# Patient Record
Sex: MALE | Race: WHITE | NOT HISPANIC OR LATINO | Employment: OTHER | URBAN - METROPOLITAN AREA
[De-identification: names, ages, dates, MRNs, and addresses within clinical notes are randomized per-mention and may not be internally consistent; named-entity substitution may affect disease eponyms.]

---

## 2021-05-04 ENCOUNTER — HISTORICAL (OUTPATIENT)
Dept: ADMINISTRATIVE | Facility: HOSPITAL | Age: 46
End: 2021-05-04

## 2021-05-04 LAB
ABS NEUT (OLG): 3.24 X10(3)/MCL (ref 2.1–9.2)
BASOPHILS # BLD AUTO: 0 X10(3)/MCL (ref 0–0.2)
BASOPHILS NFR BLD AUTO: 1 %
BUN SERPL-MCNC: 5.8 MG/DL (ref 8.9–20.6)
CALCIUM SERPL-MCNC: 9.8 MG/DL (ref 8.4–10.2)
CHLORIDE SERPL-SCNC: 103 MMOL/L (ref 98–107)
CHOLEST SERPL-MCNC: 199 MG/DL
CHOLEST/HDLC SERPL: 6 {RATIO} (ref 0–5)
CO2 SERPL-SCNC: 31 MMOL/L (ref 22–29)
CREAT SERPL-MCNC: 0.8 MG/DL (ref 0.73–1.18)
CREAT/UREA NIT SERPL: 7
EOSINOPHIL # BLD AUTO: 0.1 X10(3)/MCL (ref 0–0.9)
EOSINOPHIL NFR BLD AUTO: 2 %
ERYTHROCYTE [DISTWIDTH] IN BLOOD BY AUTOMATED COUNT: 12.7 % (ref 11.5–17)
EST. AVERAGE GLUCOSE BLD GHB EST-MCNC: 85.3 MG/DL
GLUCOSE SERPL-MCNC: 77 MG/DL (ref 74–100)
HBA1C MFR BLD: 4.6 %
HCT VFR BLD AUTO: 45.3 % (ref 42–52)
HDLC SERPL-MCNC: 34 MG/DL (ref 35–60)
HGB BLD-MCNC: 15 GM/DL (ref 14–18)
LDLC SERPL CALC-MCNC: 115 MG/DL (ref 50–140)
LYMPHOCYTES # BLD AUTO: 1.8 X10(3)/MCL (ref 0.6–4.6)
LYMPHOCYTES NFR BLD AUTO: 30 %
MCH RBC QN AUTO: 27 PG (ref 27–31)
MCHC RBC AUTO-ENTMCNC: 33.1 GM/DL (ref 33–36)
MCV RBC AUTO: 81.6 FL (ref 80–94)
MONOCYTES # BLD AUTO: 0.6 X10(3)/MCL (ref 0.1–1.3)
MONOCYTES NFR BLD AUTO: 11 %
NEUTROPHILS # BLD AUTO: 3.24 X10(3)/MCL (ref 2.1–9.2)
NEUTROPHILS NFR BLD AUTO: 55 %
PLATELET # BLD AUTO: 284 X10(3)/MCL (ref 130–400)
PMV BLD AUTO: 11.3 FL (ref 9.4–12.4)
POTASSIUM SERPL-SCNC: 4.3 MMOL/L (ref 3.5–5.1)
RBC # BLD AUTO: 5.55 X10(6)/MCL (ref 4.7–6.1)
SODIUM SERPL-SCNC: 140 MMOL/L (ref 136–145)
TRIGL SERPL-MCNC: 251 MG/DL (ref 34–140)
VLDLC SERPL CALC-MCNC: 50 MG/DL
WBC # SPEC AUTO: 5.9 X10(3)/MCL (ref 4.5–11.5)

## 2021-06-22 ENCOUNTER — HISTORICAL (OUTPATIENT)
Dept: RADIOLOGY | Facility: HOSPITAL | Age: 46
End: 2021-06-22

## 2021-06-30 ENCOUNTER — HISTORICAL (OUTPATIENT)
Dept: ADMINISTRATIVE | Facility: HOSPITAL | Age: 46
End: 2021-06-30

## 2021-07-27 ENCOUNTER — HISTORICAL (OUTPATIENT)
Dept: ADMINISTRATIVE | Facility: HOSPITAL | Age: 46
End: 2021-07-27

## 2021-08-17 ENCOUNTER — HISTORICAL (OUTPATIENT)
Dept: RESPIRATORY THERAPY | Facility: HOSPITAL | Age: 46
End: 2021-08-17

## 2021-09-15 ENCOUNTER — HISTORICAL (OUTPATIENT)
Dept: RADIOLOGY | Facility: HOSPITAL | Age: 46
End: 2021-09-15

## 2021-10-19 ENCOUNTER — HISTORICAL (OUTPATIENT)
Dept: LAB | Facility: HOSPITAL | Age: 46
End: 2021-10-19

## 2021-10-19 LAB — SARS-COV-2 AG RESP QL IA.RAPID: NEGATIVE

## 2021-10-20 ENCOUNTER — HISTORICAL (OUTPATIENT)
Dept: SURGERY | Facility: HOSPITAL | Age: 46
End: 2021-10-20

## 2021-12-01 ENCOUNTER — HISTORICAL (OUTPATIENT)
Dept: SURGERY | Facility: HOSPITAL | Age: 46
End: 2021-12-01

## 2022-04-10 ENCOUNTER — HISTORICAL (OUTPATIENT)
Dept: ADMINISTRATIVE | Facility: HOSPITAL | Age: 47
End: 2022-04-10
Payer: OTHER GOVERNMENT

## 2022-04-27 VITALS
SYSTOLIC BLOOD PRESSURE: 126 MMHG | OXYGEN SATURATION: 98 % | HEIGHT: 67 IN | BODY MASS INDEX: 35.09 KG/M2 | DIASTOLIC BLOOD PRESSURE: 95 MMHG | WEIGHT: 223.56 LBS

## 2022-05-04 NOTE — HISTORICAL [facility]
This is a historical note converted from Krista. Formatting and pictures may have been removed.  Please reference Krista for original formatting and attached multimedia. Procedure Name  1. Left L4 Transforaminal Epidural Steroid Injection  2. Left L5 Transforaminal Epidural Steroid Injection  ?  Pre-Procedure Diagnoses:  1. Chronic pain syndrome  2. Low back pain  3. Lumbar radiculopathy  4. Lumbar disc displacement  5. Lumbar degenerative disc disease  ?   Post-Procedure Diagnoses:  1. Chronic pain syndrome  2. Low back pain  3. Lumbar radiculopathy  4. Lumbar disc displacement  5. Lumbar degenerative disc disease  ?   Anesthesia:  Local and MAC  ?   Estimated Blood Loss:  None  ?  Complications:  None  ?  Informed Consent:  The procedure, risks, benefits, and alternatives were discussed with the patient.? There were no contraindications to the procedure.? The patient expressed understanding and agreed to proceed.? Fully informed written consent was obtained.?  ?  Description of the Procedure:  The patient was taken to the operating room.? IV access was obtained prior to the start of the procedure.? The patient was positioned prone on the fluoroscopy table.? Continuous hemodynamic monitoring was initiated and continued throughout the duration of the procedure.? The skin overlying the lumbosacral spine was prepped with Chloraprep and draped into a sterile field.? An oblique fluoroscopic view was obtained on the left side at L4,?with the superior articular process of the inferior vertebral body aligned with the pedicle.? Skin anesthesia was achieved using 1 mL of 1% lidocaine.? A 22-gauge 3.5-inch Quinke spinal needle was slowly inserted and advanced towards the 6 oclock position of the pedicle and into the epidural space.? Proper needle position was confirmed under AP, oblique, and lateral fluoroscopic views.? Negative aspiration for blood or CSF was confirmed.? 0.5 mL of Isovue contrast was injected.? Fluoroscopic  imaging revealed a clear outline of the spinal nerve with proximal spread of agent through the neural foramen and into the epidural space.? Then a combination of 5 mg of dexamethasone and 1 mL of 0.25% bupivacaine was easily injected.? There was no pain on injection.? The needle was removed intact and bleeding was nil.? The same procedure was repeated in identical fashion on the left side at L5. Sterile bandages were applied.? The patient was taken to the recovery room for further observation in stable condition.? The patient was then discharged home without any complications.

## 2022-05-04 NOTE — HISTORICAL [facility]
This is a historical note converted from Krista. Formatting and pictures may have been removed.  Please reference Krista for original formatting and attached multimedia. Procedure Name  1. Left L4 Transforaminal Epidural Steroid Injection  2. Left L5 Transforaminal Epidural Steroid Injection  ?  Pre-Procedure Diagnoses:  1. Chronic pain syndrome  2. Low back pain  3. Lumbar radiculopathy  4. Lumbar disc displacement  5. Lumbar degenerative disc disease  ?   Post-Procedure Diagnoses:  1. Chronic pain syndrome  2. Low back pain  3. Lumbar radiculopathy  4. Lumbar disc displacement  5. Lumbar degenerative disc disease  ?   Anesthesia:  Local and MAC  ?   Estimated Blood Loss:  None  ?  Complications:  None  ?  Informed Consent:  The procedure, risks, benefits, and alternatives were discussed with the patient.? There were no contraindications to the procedure.? The patient expressed understanding and agreed to proceed.? Fully informed written consent was obtained.?  ?  Description of the Procedure:  The patient was taken to the operating room.? IV access was obtained prior to the start of the procedure.? The patient was positioned prone on the fluoroscopy table.? Continuous hemodynamic monitoring was initiated and continued throughout the duration of the procedure.? The skin overlying the lumbosacral spine was prepped with Chloraprep and draped into a sterile field.? An oblique fluoroscopic view was obtained on the left side at L4, with the superior articular process of the inferior vertebral body aligned with the pedicle.? Skin anesthesia was achieved using 1 mL of 1% lidocaine.? A 22-gauge 5-inch Quinke spinal needle was slowly inserted and advanced towards the 6 oclock position of the pedicle and into the epidural space.? Proper needle position was confirmed under AP, oblique, and lateral fluoroscopic views.? Negative aspiration for blood or CSF was confirmed.? 0.5 mL of Isovue contrast was injected.? Fluoroscopic  imaging revealed a clear outline of the spinal nerve with proximal spread of agent through the neural foramen and into the epidural space.? Then a combination of 5 mg of dexamethasone and 1 mL of 0.25% bupivacaine was easily injected.? There was no pain on injection.? The needle was removed intact and bleeding was nil.? The same procedure was repeated in identical fashion on the left side at L5. Sterile bandages were applied.? The patient was taken to the recovery room for further observation in stable condition.? The patient was then discharged home without any complications.

## 2022-05-12 DIAGNOSIS — G47.30 SLEEP APNEA, UNSPECIFIED TYPE: Primary | ICD-10-CM

## 2022-05-20 RX ORDER — AMLODIPINE BESYLATE 5 MG/1
5 TABLET ORAL DAILY
COMMUNITY
Start: 2022-01-15 | End: 2023-04-03 | Stop reason: SDUPTHER

## 2022-05-20 RX ORDER — CYCLOBENZAPRINE HCL 10 MG
10 TABLET ORAL 3 TIMES DAILY PRN
COMMUNITY
Start: 2022-03-14 | End: 2022-10-03

## 2022-05-20 RX ORDER — TRAZODONE HYDROCHLORIDE 50 MG/1
25 TABLET ORAL
COMMUNITY
Start: 2021-10-01

## 2022-05-20 RX ORDER — HYDROCHLOROTHIAZIDE 25 MG/1
25 TABLET ORAL DAILY
COMMUNITY
Start: 2022-01-15 | End: 2023-04-03 | Stop reason: SDUPTHER

## 2022-05-23 ENCOUNTER — ANESTHESIA EVENT (OUTPATIENT)
Dept: SURGERY | Facility: HOSPITAL | Age: 47
End: 2022-05-23
Payer: OTHER GOVERNMENT

## 2022-05-24 ENCOUNTER — HOSPITAL ENCOUNTER (OUTPATIENT)
Facility: HOSPITAL | Age: 47
Discharge: HOME | End: 2022-05-24
Attending: SURGERY | Admitting: SURGERY
Payer: OTHER GOVERNMENT

## 2022-05-24 ENCOUNTER — ANESTHESIA (OUTPATIENT)
Dept: SURGERY | Facility: HOSPITAL | Age: 47
End: 2022-05-24
Payer: OTHER GOVERNMENT

## 2022-05-24 VITALS
HEART RATE: 77 BPM | TEMPERATURE: 98 F | OXYGEN SATURATION: 96 % | RESPIRATION RATE: 18 BRPM | HEIGHT: 67 IN | DIASTOLIC BLOOD PRESSURE: 96 MMHG | BODY MASS INDEX: 35.98 KG/M2 | WEIGHT: 229.25 LBS | SYSTOLIC BLOOD PRESSURE: 147 MMHG

## 2022-05-24 PROCEDURE — 01810 ANES PX NRV MUSC F/ARM WRST: CPT | Performed by: SURGERY

## 2022-05-24 PROCEDURE — 25000003 PHARM REV CODE 250: Performed by: NURSE ANESTHETIST, CERTIFIED REGISTERED

## 2022-05-24 PROCEDURE — 36000706: Performed by: SURGERY

## 2022-05-24 PROCEDURE — 71000015 HC POSTOP RECOV 1ST HR: Performed by: SURGERY

## 2022-05-24 PROCEDURE — 63600175 PHARM REV CODE 636 W HCPCS: Performed by: ANESTHESIOLOGY

## 2022-05-24 PROCEDURE — 36000707: Performed by: SURGERY

## 2022-05-24 PROCEDURE — 63600175 PHARM REV CODE 636 W HCPCS

## 2022-05-24 PROCEDURE — 71000016 HC POSTOP RECOV ADDL HR: Performed by: SURGERY

## 2022-05-24 PROCEDURE — 25000003 PHARM REV CODE 250: Performed by: SURGERY

## 2022-05-24 PROCEDURE — 37000008 HC ANESTHESIA 1ST 15 MINUTES: Performed by: SURGERY

## 2022-05-24 PROCEDURE — 37000009 HC ANESTHESIA EA ADD 15 MINS: Performed by: SURGERY

## 2022-05-24 PROCEDURE — 63600175 PHARM REV CODE 636 W HCPCS: Performed by: NURSE ANESTHETIST, CERTIFIED REGISTERED

## 2022-05-24 PROCEDURE — 64415 NJX AA&/STRD BRCH PLXS IMG: CPT | Mod: 59,RT | Performed by: ANESTHESIOLOGY

## 2022-05-24 RX ORDER — HYDROMORPHONE HYDROCHLORIDE 2 MG/ML
0.2 INJECTION, SOLUTION INTRAMUSCULAR; INTRAVENOUS; SUBCUTANEOUS EVERY 5 MIN PRN
Status: CANCELLED | OUTPATIENT
Start: 2022-05-24

## 2022-05-24 RX ORDER — MIDAZOLAM HYDROCHLORIDE 1 MG/ML
INJECTION INTRAMUSCULAR; INTRAVENOUS
Status: COMPLETED
Start: 2022-05-24 | End: 2022-05-24

## 2022-05-24 RX ORDER — KETOROLAC TROMETHAMINE 30 MG/ML
15 INJECTION, SOLUTION INTRAMUSCULAR; INTRAVENOUS EVERY 8 HOURS PRN
Status: CANCELLED | OUTPATIENT
Start: 2022-05-24 | End: 2022-05-26

## 2022-05-24 RX ORDER — ONDANSETRON 4 MG/1
8 TABLET, ORALLY DISINTEGRATING ORAL EVERY 6 HOURS PRN
Status: DISCONTINUED | OUTPATIENT
Start: 2022-05-24 | End: 2022-05-24 | Stop reason: HOSPADM

## 2022-05-24 RX ORDER — ROPIVACAINE HYDROCHLORIDE 5 MG/ML
INJECTION, SOLUTION EPIDURAL; INFILTRATION; PERINEURAL
Status: COMPLETED
Start: 2022-05-24 | End: 2022-05-24

## 2022-05-24 RX ORDER — SODIUM CHLORIDE 0.9 % (FLUSH) 0.9 %
3 SYRINGE (ML) INJECTION
Status: DISCONTINUED | OUTPATIENT
Start: 2022-05-24 | End: 2022-05-24 | Stop reason: HOSPADM

## 2022-05-24 RX ORDER — SODIUM CHLORIDE, SODIUM LACTATE, POTASSIUM CHLORIDE, CALCIUM CHLORIDE 600; 310; 30; 20 MG/100ML; MG/100ML; MG/100ML; MG/100ML
INJECTION, SOLUTION INTRAVENOUS CONTINUOUS
Status: DISCONTINUED | OUTPATIENT
Start: 2022-05-24 | End: 2022-05-24 | Stop reason: HOSPADM

## 2022-05-24 RX ORDER — ROPIVACAINE HYDROCHLORIDE 5 MG/ML
INJECTION, SOLUTION EPIDURAL; INFILTRATION; PERINEURAL
Status: COMPLETED | OUTPATIENT
Start: 2022-05-24 | End: 2022-05-24

## 2022-05-24 RX ORDER — PROPOFOL 10 MG/ML
VIAL (ML) INTRAVENOUS
Status: DISCONTINUED | OUTPATIENT
Start: 2022-05-24 | End: 2022-05-24

## 2022-05-24 RX ORDER — METOCLOPRAMIDE HYDROCHLORIDE 5 MG/ML
10 INJECTION INTRAMUSCULAR; INTRAVENOUS EVERY 10 MIN PRN
Status: CANCELLED | OUTPATIENT
Start: 2022-05-24

## 2022-05-24 RX ORDER — FENTANYL CITRATE 50 UG/ML
INJECTION, SOLUTION INTRAMUSCULAR; INTRAVENOUS
Status: COMPLETED
Start: 2022-05-24 | End: 2022-05-24

## 2022-05-24 RX ORDER — LIDOCAINE HYDROCHLORIDE 10 MG/ML
INJECTION INFILTRATION; PERINEURAL
Status: DISCONTINUED | OUTPATIENT
Start: 2022-05-24 | End: 2022-05-24 | Stop reason: HOSPADM

## 2022-05-24 RX ORDER — ONDANSETRON 2 MG/ML
4 INJECTION INTRAMUSCULAR; INTRAVENOUS DAILY PRN
Status: CANCELLED | OUTPATIENT
Start: 2022-05-24

## 2022-05-24 RX ORDER — SODIUM CHLORIDE, SODIUM GLUCONATE, SODIUM ACETATE, POTASSIUM CHLORIDE AND MAGNESIUM CHLORIDE 30; 37; 368; 526; 502 MG/100ML; MG/100ML; MG/100ML; MG/100ML; MG/100ML
1000 INJECTION, SOLUTION INTRAVENOUS CONTINUOUS
Status: DISCONTINUED | OUTPATIENT
Start: 2022-05-24 | End: 2022-05-24 | Stop reason: HOSPADM

## 2022-05-24 RX ORDER — LIDOCAINE HYDROCHLORIDE 10 MG/ML
1 INJECTION, SOLUTION EPIDURAL; INFILTRATION; INTRACAUDAL; PERINEURAL ONCE
Status: DISCONTINUED | OUTPATIENT
Start: 2022-05-24 | End: 2022-05-24 | Stop reason: HOSPADM

## 2022-05-24 RX ORDER — LIDOCAINE HYDROCHLORIDE 10 MG/ML
INJECTION, SOLUTION EPIDURAL; INFILTRATION; INTRACAUDAL; PERINEURAL
Status: DISCONTINUED | OUTPATIENT
Start: 2022-05-24 | End: 2022-05-24

## 2022-05-24 RX ADMIN — LIDOCAINE HYDROCHLORIDE 40 MG: 10 INJECTION, SOLUTION EPIDURAL; INFILTRATION; INTRACAUDAL; PERINEURAL at 09:05

## 2022-05-24 RX ADMIN — MIDAZOLAM HYDROCHLORIDE 2 MG: 1 INJECTION, SOLUTION INTRAMUSCULAR; INTRAVENOUS at 09:05

## 2022-05-24 RX ADMIN — PROPOFOL 100 MG: 10 INJECTION, EMULSION INTRAVENOUS at 09:05

## 2022-05-24 RX ADMIN — SODIUM CHLORIDE, POTASSIUM CHLORIDE, SODIUM LACTATE AND CALCIUM CHLORIDE: 600; 310; 30; 20 INJECTION, SOLUTION INTRAVENOUS at 09:05

## 2022-05-24 RX ADMIN — MIDAZOLAM HYDROCHLORIDE 1 MG: 1 INJECTION, SOLUTION INTRAMUSCULAR; INTRAVENOUS at 09:05

## 2022-05-24 RX ADMIN — FENTANYL CITRATE 25 MCG: 50 INJECTION, SOLUTION INTRAMUSCULAR; INTRAVENOUS at 09:05

## 2022-05-24 RX ADMIN — PROPOFOL 50 MG: 10 INJECTION, EMULSION INTRAVENOUS at 10:05

## 2022-05-24 RX ADMIN — PROPOFOL 110 MG: 10 INJECTION, EMULSION INTRAVENOUS at 09:05

## 2022-05-24 RX ADMIN — ROPIVACAINE HYDROCHLORIDE 30 ML: 5 INJECTION, SOLUTION EPIDURAL; INFILTRATION; PERINEURAL at 09:05

## 2022-05-24 RX ADMIN — PROPOFOL 75 MG: 10 INJECTION, EMULSION INTRAVENOUS at 10:05

## 2022-05-24 NOTE — ANESTHESIA PREPROCEDURE EVALUATION
05/23/2022  Saad Lopez is a 47 y.o. Obese , male.presents with Right hand carpal tunnel syndrome.   (Carpal tunnel syndrome, right [G56.01])     He comes to [facility] for the above procedure under Regional/Supraclavicular block with IV sedation/Propofol infusion.    PMHx:  [place]    Pre-op Assessment    I have reviewed the Patient Summary Reports.     I have reviewed the Nursing Notes. I have reviewed the NPO Status.   I have reviewed the Medications.     Review of Systems  Anesthesia Hx:  No problems with previous Anesthesia    Social:  Non-Smoker    Hematology/Oncology:  Hematology Normal   Oncology Normal     EENT/Dental:EENT/Dental Normal   Cardiovascular:   Exercise tolerance: good Hypertension  Functional Capacity good / => 4 METS    Pulmonary:   Sleep Apnea    Renal/:  Renal/ Normal     Hepatic/GI:  Hepatic/GI Normal    Musculoskeletal:  Musculoskeletal Normal    Neurological:   Neuromuscular Disease, Carpal Tunnel syndrome, Right hand/wrist.   Endocrine:  Endocrine Normal  Obesity / BMI > 30  Dermatological:  Skin Normal    Psych:  Psychiatric Normal           Physical Exam  General: Alert, Oriented, Well nourished and Cooperative    Airway:  Mallampati: II   Mouth Opening: Normal  TM Distance: Normal  Tongue: Normal  Neck ROM: Normal ROM    Dental:  Intact    Chest/Lungs:  Clear to auscultation, Normal Respiratory Rate    Heart:  Rate: Normal  Rhythm: Regular Rhythm        Anesthesia Plan  Type of Anesthesia, risks & benefits discussed:    Anesthesia Type: Regional, Gen Natural Airway  Intra-op Monitoring Plan: Standard ASA Monitors  Post Op Pain Control Plan: peripheral nerve block  Induction:  IV  Informed Consent: Informed consent signed with the Patient and all parties understand the risks and agree with anesthesia plan.  All questions answered.   ASA Score: 2  Day of Surgery Review  of History & Physical: H&P Update referred to the surgeon/provider.    Ready For Surgery From Anesthesia Perspective.     .

## 2022-05-24 NOTE — OP NOTE
[facility]                       [address]                      [place] [address]    PATIENT NAME:      CARA ANGLIN  YOB: 1975  CSN:               [id number]  MRN:               [id number]  ADMIT DATE:        05/24/2022 07:08:00  PHYSICIAN:         Mason Palmer MD                          OPERATIVE REPORT      DATE OF SURGERY:        SURGEON:  Mason Palmer MD    PREOPERATIVE DIAGNOSIS:  Carpal tunnel right wrist.    POSTOPERATIVE DIAGNOSIS:  Carpal tunnel right wrist.    PROCEDURE:  Carpal tunnel release.    ANESTHESIA:  Axillary block with supplemental local infiltration.    ESTIMATED BLOOD LOSS:  Minimal.    DESCRIPTION OF PROCEDURE:  In the operating suite under axillary block   anesthetic, the right arm was prepped and draped in a sterile fashion. The arm   was exsanguinated and tourniquet inflated to 250 mmHg.  Supplemental local   infiltration of Xylocaine was performed in the distal wrist and palm.  Right   palm was explored through a small proximal curved incision. The skin and   subcutaneous tissues were incised with the Bovie cautery.  The median nerve was   identified at the level of the distal wrist crease and the carpal ligament   transected on the ulnar border of the canal throughout the entire length of the   canal using a 69 Lytton blade.  During the dissection, care was taken to   identify and preserve the recurrent motor branch. I turned proximally in the   distal forearm and released the subcutaneous fascia.  The epineurium surrounding   the nerve was gently teased with fine pickups and forceps.  Once the nerve was   completely freed, the tourniquet was released. Final hemostasis obtained with   the Bovie cautery.  Skin incision was closed with vertical mattress sutures of   5-0 and 6-0 Prolene.  Sterile splint dressing was then applied and procedure    terminated.    ______________________________  MD ROBERT Houston/HERRERA  DD:  05/24/2022  Time:  10:40AM  DT:  05/24/2022  Time:  01:05PM  Job #:  [id number]      OPERATIVE REPORT

## 2022-05-24 NOTE — ADDENDUM NOTE
Addendum  created 05/24/22 1117 by Cedrick Fuentes MD    Clinical Note Signed, Diagnosis association updated, Intraprocedure Blocks edited, SmartForm saved

## 2022-05-24 NOTE — DISCHARGE INSTRUCTIONS
FOLLOW DR LIGHT'S INSTRUCTIONS POST OPERATIVELY...    WEAR THE SLING FOR THE FIRST 24 HOURS..    DO NOT REMOVE THE DRESSING, KEEP DRY AND INTACT....YOU MAY COVER WITH A PLASTIC BAG TO SHOWER...  DRESSING TO BE REMOVED AT YOUR FOLLOW UP APPOINTMENT     YOU WANT TO REMEMBER TO MOVE YOUR FINGERS.    IF YOU HAVE ANY QUESTIONS OR CONCERNS DO NOT HESITATE TO CALL...    TAKE YOUR MEDS AS DIRECTED....    KEEP YOUR FOLLOW UP APPOINTMENT

## 2022-05-24 NOTE — ANESTHESIA POSTPROCEDURE EVALUATION
Anesthesia Post Evaluation    Patient: Saad Lopez    Procedure(s) Performed: Procedure(s) (LRB):  RELEASE, CARPAL TUNNEL Right / Axillary Block (Right)    Final Anesthesia Type: MAC      Patient location during evaluation: OPS  Patient participation: Yes- Able to Participate  Level of consciousness: awake and alert  Post-procedure vital signs: reviewed and stable  Pain management: adequate  Airway patency: patent    PONV status at discharge: No PONV  Anesthetic complications: no      Cardiovascular status: blood pressure returned to baseline  Respiratory status: unassisted and room air  Hydration status: euvolemic  Follow-up not needed.          Vitals Value Taken Time   /114 05/24/22 0737   Temp 36.8 °C (98.2 °F) 05/24/22 0737   Pulse 83 05/24/22 0737   Resp 18 05/24/22 0925   SpO2 98 % 05/24/22 0737         No case tracking events are documented in the log.      Pain/Timmy Score: Pain Rating Prior to Med Admin: 4 (5/24/2022  9:25 AM)

## 2022-05-24 NOTE — ANESTHESIA PROCEDURE NOTES
Peripheral Block    Patient location during procedure: pre-op   Block not for primary anesthetic.  Reason for block: at surgeon's request and post-op pain management   Post-op Pain Location: Carpal Tunnel Syndrome, right   Start time: 5/24/2022 9:26 AM  Timeout: 5/24/2022 9:15 AM   End time: 5/24/2022 9:35 AM    Staffing  Authorizing Provider: Cedrick Fuentes MD  Performing Provider: Cedrick Fuentes MD    Preanesthetic Checklist  Completed: patient identified, IV checked, site marked, risks and benefits discussed, surgical consent, monitors and equipment checked, pre-op evaluation and timeout performed  Peripheral Block  Patient position: supine  Prep: ChloraPrep  Patient monitoring: heart rate, cardiac monitor, continuous pulse ox, continuous capnometry and frequent blood pressure checks  Block type: supraclavicular  Laterality: right  Injection technique: single shot  Needle  Needle type: Stimuplex   Needle gauge: 22 G  Needle length: 3.5 in  Needle localization: anatomical landmarks, ultrasound guidance, nerve stimulator and paresthesias   -ultrasound image captured on disc.  Assessment  Injection assessment: negative aspiration, negative parasthesia and local visualized surrounding nerve  Paresthesia pain: immediately resolved  Heart rate change: no  Slow fractionated injection: yes  Pain Tolerance: comfortable throughout block and no complaints  Medications:    Medications: ropivacaine (NAROPIN) injection 0.5% - Perineural, Right Supraclavicular   30 mL - 5/24/2022 9:25:00 AM    Additional Notes  Block was technically difficult secondary to size and anatomy(Obesity). Location of nerve bundle identified and local deposited around bundle.  Paresthesia was noted but immediately resolved in his Right hand.          VSS.  [facility] RN monitoring vitals throughout procedure.  Patient tolerated procedure well with sedation.

## 2022-05-30 DIAGNOSIS — N40.1 BENIGN PROSTATIC HYPERPLASIA WITH LOWER URINARY TRACT SYMPTOMS, SYMPTOM DETAILS UNSPECIFIED: Primary | ICD-10-CM

## 2022-05-30 RX ORDER — TAMSULOSIN HYDROCHLORIDE 0.4 MG/1
0.4 CAPSULE ORAL DAILY
Qty: 30 CAPSULE | Refills: 11 | Status: SHIPPED | OUTPATIENT
Start: 2022-05-30 | End: 2023-07-26

## 2022-07-27 LAB — CRC RECOMMENDATION EXT: NORMAL

## 2022-07-29 ENCOUNTER — OFFICE VISIT (OUTPATIENT)
Dept: UROLOGY | Facility: CLINIC | Age: 47
End: 2022-07-29
Payer: OTHER GOVERNMENT

## 2022-07-29 VITALS
HEIGHT: 67 IN | RESPIRATION RATE: 20 BRPM | DIASTOLIC BLOOD PRESSURE: 85 MMHG | BODY MASS INDEX: 35.36 KG/M2 | OXYGEN SATURATION: 96 % | TEMPERATURE: 98 F | WEIGHT: 225.31 LBS | SYSTOLIC BLOOD PRESSURE: 124 MMHG | HEART RATE: 73 BPM

## 2022-07-29 DIAGNOSIS — N40.0 BENIGN PROSTATIC HYPERPLASIA WITHOUT LOWER URINARY TRACT SYMPTOMS: ICD-10-CM

## 2022-07-29 PROCEDURE — 99214 OFFICE O/P EST MOD 30 MIN: CPT | Mod: PBBFAC | Performed by: NURSE PRACTITIONER

## 2022-07-29 PROCEDURE — 99203 OFFICE O/P NEW LOW 30 MIN: CPT | Mod: S$PBB,,, | Performed by: NURSE PRACTITIONER

## 2022-07-29 PROCEDURE — 99203 PR OFFICE/OUTPT VISIT, NEW, LEVL III, 30-44 MIN: ICD-10-PCS | Mod: S$PBB,,, | Performed by: NURSE PRACTITIONER

## 2022-07-29 NOTE — PROGRESS NOTES
Chief Complaint:   Chief Complaint   Patient presents with    Benign Prostatic Hypertrophy       HPI:  Patient is a 47-year-old male 3 month follow-up for BPH.  Patient was started on Flomax and instructed on behavior modification for C/O decreased urine stream, + hesitancy, +straining,+ interruption of stream, + dribbling. Today patient urine stream strong, he denies hesitancy, straining, interruption of stream, dribbling, frequency, nocturia as long as he takes his Flomax.       Allergies:  Review of patient's allergies indicates:   Allergen Reactions    Pcn [penicillins] Swelling and Rash       Medications:  Current Outpatient Medications   Medication Sig Dispense Refill    amLODIPine (NORVASC) 5 MG tablet Take 5 mg by mouth once daily.      cyclobenzaprine (FLEXERIL) 10 MG tablet Take 10 mg by mouth 3 (three) times daily as needed.      hydroCHLOROthiazide (HYDRODIURIL) 25 MG tablet Take 25 mg by mouth once daily.      tamsulosin (FLOMAX) 0.4 mg Cap Take 1 capsule (0.4 mg total) by mouth once daily. 30 capsule 11    traZODone (DESYREL) 50 MG tablet Take 25 mg by mouth.       No current facility-administered medications for this visit.       Review of Systems:  General: No fever, chills, fatigability, or weight loss.  Skin: No rashes, itching, or changes in color or texture of skin.  Chest: Denies COX, cyanosis, wheezing, cough, and sputum production.  Abdomen: Appetite fine. No weight loss. Denies diarrhea, abdominal pain, hematemesis, or blood in stool.  Musculoskeletal: No joint stiffness or swelling. Denies back pain.  : As above.  All other review of systems negative.    PMH:  Past Medical History:   Diagnosis Date    Carpal tunnel syndrome on right     Chronic pain     Hypertension     Mixed hyperlipidemia     Sleep apnea     Tinnitus        PSH:  Past Surgical History:   Procedure Laterality Date    CARPAL TUNNEL RELEASE Right 5/24/2022    Procedure: RELEASE, CARPAL TUNNEL Right / Axillary  Block;  Surgeon: Mason Palmer MD;  Location: [facility];  Service: General;  Laterality: Right;    EPIDURAL STEROID INJECTION  10/2021    2021    TYMPANOSTOMY TUBE PLACEMENT      x9       FamHx:  Family History   Problem Relation Age of Onset    Hypertension Mother     Skin cancer Father     Prostate cancer Father        SocHx:  Social History     Socioeconomic History    Marital status:    Tobacco Use    Smoking status: Former Smoker     Packs/day: 0.50     Years: 10.00     Pack years: 5.00     Quit date:      Years since quittin.5    Smokeless tobacco: Never Used   Substance and Sexual Activity    Alcohol use: Not Currently    Drug use: Never    Sexual activity: Not Currently       Physical Exam:  Vitals:    22 1101   BP: 124/85   Pulse: 73   Resp: 20   Temp: 98.4 °F (36.9 °C)     General: A&Ox3, no apparent distress, no deformities  Neck: No masses, normal thyroid  Lungs: CTA merced, no use of accessory muscles  Heart: RRR, no arrhythmias  Abdomen: Soft, NT, ND, no masses, no hernias, no hepatosplenomegaly  Lymphatic: Neck and groin nodes negative  Skin: The skin is warm and dry. No jaundice.  Ext: No c/c/e.      Imaging:  None      Impression:  BPH    Plan: Continue Flomax F/U PRN

## 2022-09-20 DIAGNOSIS — R91.8 ABNORMAL CT SCAN, LUNG: Primary | ICD-10-CM

## 2022-09-30 ENCOUNTER — HOSPITAL ENCOUNTER (OUTPATIENT)
Dept: RADIOLOGY | Facility: HOSPITAL | Age: 47
Discharge: HOME | End: 2022-09-30
Attending: STUDENT IN AN ORGANIZED HEALTH CARE EDUCATION/TRAINING PROGRAM
Payer: OTHER GOVERNMENT

## 2022-09-30 DIAGNOSIS — R91.8 ABNORMAL CT SCAN, LUNG: ICD-10-CM

## 2022-09-30 PROCEDURE — 71250 CT THORAX DX C-: CPT | Mod: TC

## 2022-10-03 ENCOUNTER — OFFICE VISIT (OUTPATIENT)
Dept: INTERNAL MEDICINE | Facility: CLINIC | Age: 47
End: 2022-10-03
Payer: OTHER GOVERNMENT

## 2022-10-03 VITALS
SYSTOLIC BLOOD PRESSURE: 136 MMHG | BODY MASS INDEX: 35.79 KG/M2 | DIASTOLIC BLOOD PRESSURE: 74 MMHG | HEIGHT: 67 IN | OXYGEN SATURATION: 96 % | HEART RATE: 77 BPM | RESPIRATION RATE: 18 BRPM | WEIGHT: 228 LBS

## 2022-10-03 DIAGNOSIS — Z00.00 WELLNESS EXAMINATION: Primary | ICD-10-CM

## 2022-10-03 DIAGNOSIS — N40.0 BENIGN PROSTATIC HYPERPLASIA WITHOUT LOWER URINARY TRACT SYMPTOMS: ICD-10-CM

## 2022-10-03 DIAGNOSIS — I10 PRIMARY HYPERTENSION: ICD-10-CM

## 2022-10-03 PROCEDURE — 99396 PR PREVENTIVE VISIT,EST,40-64: ICD-10-PCS | Mod: 25,,, | Performed by: STUDENT IN AN ORGANIZED HEALTH CARE EDUCATION/TRAINING PROGRAM

## 2022-10-03 PROCEDURE — 99396 PREV VISIT EST AGE 40-64: CPT | Mod: 25,,, | Performed by: STUDENT IN AN ORGANIZED HEALTH CARE EDUCATION/TRAINING PROGRAM

## 2022-10-03 PROCEDURE — 90686 FLU VACCINE (QUAD) GREATER THAN OR EQUAL TO 3YO PRESERVATIVE FREE IM: ICD-10-PCS | Mod: ,,, | Performed by: STUDENT IN AN ORGANIZED HEALTH CARE EDUCATION/TRAINING PROGRAM

## 2022-10-03 PROCEDURE — 90471 FLU VACCINE (QUAD) GREATER THAN OR EQUAL TO 3YO PRESERVATIVE FREE IM: ICD-10-PCS | Mod: ,,, | Performed by: STUDENT IN AN ORGANIZED HEALTH CARE EDUCATION/TRAINING PROGRAM

## 2022-10-03 PROCEDURE — 90686 IIV4 VACC NO PRSV 0.5 ML IM: CPT | Mod: ,,, | Performed by: STUDENT IN AN ORGANIZED HEALTH CARE EDUCATION/TRAINING PROGRAM

## 2022-10-03 PROCEDURE — 90471 IMMUNIZATION ADMIN: CPT | Mod: ,,, | Performed by: STUDENT IN AN ORGANIZED HEALTH CARE EDUCATION/TRAINING PROGRAM

## 2022-10-03 RX ORDER — TRIAMCINOLONE ACETONIDE 5 MG/G
OINTMENT TOPICAL
COMMUNITY
Start: 2022-06-10

## 2022-10-03 RX ORDER — IPRATROPIUM BROMIDE 42 UG/1
SPRAY, METERED NASAL
COMMUNITY
Start: 2022-08-23

## 2022-10-03 RX ORDER — LORATADINE 10 MG/1
10 TABLET ORAL DAILY
COMMUNITY
Start: 2022-06-10

## 2022-10-03 RX ORDER — FLUTICASONE PROPIONATE 50 MCG
1 SPRAY, SUSPENSION (ML) NASAL 2 TIMES DAILY
COMMUNITY
Start: 2022-08-23

## 2022-10-03 RX ORDER — CHOLECALCIFEROL (VITAMIN D3) 50 MCG
TABLET ORAL
COMMUNITY
Start: 2022-06-10

## 2022-10-03 NOTE — PROGRESS NOTES
Subjective:      Saad Lopez  10/03/2022  [id number]      Chief Complaint: Follow-up and ear are stuffed up       HPI:  Mr Saad presents for follow up and wellness. Patient is doing well, states had a URI last week, has recovered but continues to feel pressure in his ears. No other complaints, recently did follow up CT chest.      Past Medical History:   Diagnosis Date    Carpal tunnel syndrome on right     Chronic pain     Hypertension     Mixed hyperlipidemia     Sleep apnea     Tinnitus      Past Surgical History:   Procedure Laterality Date    CARPAL TUNNEL RELEASE Right 2022    Procedure: RELEASE, CARPAL TUNNEL Right / Axillary Block;  Surgeon: Mason Palmer MD;  Location: [facility];  Service: General;  Laterality: Right;    EPIDURAL STEROID INJECTION  10/2021    2021    TYMPANOSTOMY TUBE PLACEMENT      x9     Family History   Problem Relation Age of Onset    Hypertension Mother     Skin cancer Father     Prostate cancer Father      Social History     Tobacco Use    Smoking status: Former     Packs/day: 0.50     Years: 10.00     Pack years: 5.00     Types: Cigarettes     Quit date:      Years since quittin.7    Smokeless tobacco: Never   Substance and Sexual Activity    Alcohol use: Not Currently    Drug use: Never    Sexual activity: Not Currently     Review of patient's allergies indicates:   Allergen Reactions    Pcn [penicillins] Swelling and Rash       The following were reviewed at this visit: active problem list, medication list, allergies, family history, social history, and health maintenance.    Medications:    Current Outpatient Medications:     amLODIPine (NORVASC) 5 MG tablet, Take 5 mg by mouth once daily., Disp: , Rfl:     cholecalciferol, vitamin D3, (VITAMIN D3) 50 mcg (2,000 unit) Tab, Take by mouth., Disp: , Rfl:     fluticasone propionate (FLONASE) 50 mcg/actuation nasal spray, 1 spray by Each Nostril route 2 (two) times daily., Disp: , Rfl:      "hydroCHLOROthiazide (HYDRODIURIL) 25 MG tablet, Take 25 mg by mouth once daily., Disp: , Rfl:     ipratropium (ATROVENT) 42 mcg (0.06 %) nasal spray, SMARTSI Spray(s) Both Nares 3 Times Daily PRN, Disp: , Rfl:     loratadine (CLARITIN) 10 mg tablet, Take 10 mg by mouth once daily., Disp: , Rfl:     tamsulosin (FLOMAX) 0.4 mg Cap, Take 1 capsule (0.4 mg total) by mouth once daily., Disp: 30 capsule, Rfl: 11    traZODone (DESYREL) 50 MG tablet, Take 25 mg by mouth., Disp: , Rfl:     triamcinolone (KENALOG) 0.5 % ointment, Apply topically., Disp: , Rfl:       Medications have been reviewed and reconciled with patient at this visit.  Barriers to medications reviewed with patient.    Adverse reactions to current medications reviewed with patient..    Over the counter medications reviewed and reconciled with patient.  Review of Systems   Constitutional:  Negative for chills, diaphoresis, fever and weight loss.   HENT:  Negative for congestion, ear discharge, sinus pain and sore throat.    Eyes:  Negative for photophobia.   Respiratory:  Negative for cough, hemoptysis and shortness of breath.    Cardiovascular:  Negative for chest pain, palpitations, claudication, leg swelling and PND.   Gastrointestinal:  Negative for constipation, diarrhea, nausea and vomiting.   Genitourinary:  Negative for dysuria, frequency and urgency.   Musculoskeletal:  Negative for back pain, joint pain, myalgias and neck pain.   Skin:  Negative for itching and rash.   Neurological:  Negative for dizziness, focal weakness and headaches.   Psychiatric/Behavioral:  Negative for depression. The patient does not have insomnia.          Objective:      Vitals:    10/03/22 1017   BP: 136/74   BP Location: Left arm   Pulse: 77   Resp: 18   SpO2: 96%   Weight: 103.4 kg (228 lb)   Height: 5' 7" (1.702 m)       Physical Exam  Constitutional:       Appearance: Normal appearance.   HENT:      Head: Normocephalic and atraumatic.      Right Ear: Tympanic " membrane, ear canal and external ear normal.      Left Ear: Tympanic membrane, ear canal and external ear normal.   Cardiovascular:      Rate and Rhythm: Normal rate and regular rhythm.      Pulses: Normal pulses.   Pulmonary:      Effort: Pulmonary effort is normal.      Breath sounds: Normal breath sounds.   Abdominal:      General: Abdomen is flat. Bowel sounds are normal. There is no distension.      Palpations: Abdomen is soft.      Tenderness: There is no abdominal tenderness.   Musculoskeletal:         General: No tenderness. Normal range of motion.      Right lower leg: No edema.      Left lower leg: No edema.   Lymphadenopathy:      Cervical: No cervical adenopathy.   Neurological:      General: No focal deficit present.      Mental Status: He is alert and oriented to person, place, and time.             Assessment and Plan:       1. Wellness examination  Assessment & Plan:  Colonoscopy: up to date  Influenza vaccine: received today  Labs: ordered today     Orders:  -     CBC Auto Differential  -     Basic Metabolic Panel  -     Lipid Panel    2. Benign prostatic hyperplasia without lower urinary tract symptoms  Assessment & Plan:  Follows with Urology  Continue current medications       3. Primary hypertension  Assessment & Plan:  Controlled  Continue current medications       Other orders  -     Influenza - Quadrivalent *Preferred* (6 months+) (PF)          Follow up: Follow up in about 6 months (around 4/3/2023) for Bp follow up.

## 2023-01-02 PROBLEM — Z00.00 WELLNESS EXAMINATION: Status: RESOLVED | Noted: 2022-10-03 | Resolved: 2023-01-02

## 2023-03-27 ENCOUNTER — TELEPHONE (OUTPATIENT)
Dept: INTERNAL MEDICINE | Facility: CLINIC | Age: 48
End: 2023-03-27
Payer: OTHER GOVERNMENT

## 2023-04-03 ENCOUNTER — OFFICE VISIT (OUTPATIENT)
Dept: INTERNAL MEDICINE | Facility: CLINIC | Age: 48
End: 2023-04-03
Payer: OTHER GOVERNMENT

## 2023-04-03 VITALS
BODY MASS INDEX: 34.69 KG/M2 | HEIGHT: 67 IN | WEIGHT: 221 LBS | DIASTOLIC BLOOD PRESSURE: 91 MMHG | SYSTOLIC BLOOD PRESSURE: 138 MMHG

## 2023-04-03 DIAGNOSIS — I10 PRIMARY HYPERTENSION: ICD-10-CM

## 2023-04-03 DIAGNOSIS — R21 RASH: ICD-10-CM

## 2023-04-03 PROCEDURE — 99214 OFFICE O/P EST MOD 30 MIN: CPT | Mod: ,,, | Performed by: STUDENT IN AN ORGANIZED HEALTH CARE EDUCATION/TRAINING PROGRAM

## 2023-04-03 PROCEDURE — 99214 PR OFFICE/OUTPT VISIT, EST, LEVL IV, 30-39 MIN: ICD-10-PCS | Mod: ,,, | Performed by: STUDENT IN AN ORGANIZED HEALTH CARE EDUCATION/TRAINING PROGRAM

## 2023-04-03 RX ORDER — AMLODIPINE BESYLATE 10 MG/1
10 TABLET ORAL DAILY
Qty: 30 TABLET | Refills: 3 | Status: SHIPPED | OUTPATIENT
Start: 2023-04-03 | End: 2023-09-15

## 2023-04-03 RX ORDER — METHYLPREDNISOLONE 4 MG/1
TABLET ORAL
Qty: 21 EACH | Refills: 0 | Status: SHIPPED | OUTPATIENT
Start: 2023-04-03 | End: 2023-04-25

## 2023-04-03 RX ORDER — HYDROCHLOROTHIAZIDE 25 MG/1
25 TABLET ORAL DAILY
Qty: 30 TABLET | Refills: 3 | Status: SHIPPED | OUTPATIENT
Start: 2023-04-03 | End: 2023-09-15

## 2023-04-05 PROBLEM — R21 RASH: Status: ACTIVE | Noted: 2023-04-05

## 2023-04-05 NOTE — ASSESSMENT & PLAN NOTE
Uncontrolled  Will increase amlodipine to 10 mg QD  Will continue HCTZ 25 mg QD  Continue to check blood pressure at home  Will follow up in 3 weeks

## 2023-04-05 NOTE — PROGRESS NOTES
Subjective:      Saad Lopez  2023  [id number]      Chief Complaint: Follow-up (Patient has poison ivy. )       HPI:  Mr Nash presents for blood pressure follow up. Blood pressure is slightly elevated, patient reports compliant with medications. Presents with rash on both upper extremities with pruritus, states was working outside and after that rash started went to urgent care received a steroid injection and was prescribed triamcinolone, rash has improved but still present.     Past Medical History:   Diagnosis Date    Carpal tunnel syndrome on right     Chronic pain     Hypertension     Mixed hyperlipidemia     Sleep apnea     Tinnitus      Past Surgical History:   Procedure Laterality Date    CARPAL TUNNEL RELEASE Right 2022    Procedure: RELEASE, CARPAL TUNNEL Right / Axillary Block;  Surgeon: Mason Palmer MD;  Location: [facility] OR;  Service: General;  Laterality: Right;    EPIDURAL STEROID INJECTION  10/2021    2021    TYMPANOSTOMY TUBE PLACEMENT      x9     Family History   Problem Relation Age of Onset    Hypertension Mother     Skin cancer Father     Prostate cancer Father      Social History     Tobacco Use    Smoking status: Former     Packs/day: 0.50     Years: 10.00     Pack years: 5.00     Types: Cigarettes     Quit date:      Years since quittin.2    Smokeless tobacco: Never   Substance and Sexual Activity    Alcohol use: Not Currently    Drug use: Never    Sexual activity: Not Currently     Review of patient's allergies indicates:   Allergen Reactions    Pcn [penicillins] Swelling and Rash       The following were reviewed at this visit: active problem list, medication list, allergies, family history, social history, and health maintenance.    Medications:    Current Outpatient Medications:     cholecalciferol, vitamin D3, (VITAMIN D3) 50 mcg (2,000 unit) Tab, Take by mouth., Disp: , Rfl:     fluticasone propionate (FLONASE) 50 mcg/actuation nasal spray, 1  spray by Each Nostril route 2 (two) times daily., Disp: , Rfl:     ipratropium (ATROVENT) 42 mcg (0.06 %) nasal spray, SMARTSI Spray(s) Both Nares 3 Times Daily PRN, Disp: , Rfl:     loratadine (CLARITIN) 10 mg tablet, Take 10 mg by mouth once daily., Disp: , Rfl:     tamsulosin (FLOMAX) 0.4 mg Cap, Take 1 capsule (0.4 mg total) by mouth once daily., Disp: 30 capsule, Rfl: 11    traZODone (DESYREL) 50 MG tablet, Take 25 mg by mouth., Disp: , Rfl:     triamcinolone (KENALOG) 0.5 % ointment, Apply topically., Disp: , Rfl:     amLODIPine (NORVASC) 10 MG tablet, Take 1 tablet (10 mg total) by mouth once daily., Disp: 30 tablet, Rfl: 3    hydroCHLOROthiazide (HYDRODIURIL) 25 MG tablet, Take 1 tablet (25 mg total) by mouth once daily., Disp: 30 tablet, Rfl: 3    methylPREDNISolone (MEDROL DOSEPACK) 4 mg tablet, use as directed, Disp: 21 each, Rfl: 0      Medications have been reviewed and reconciled with patient at this visit.  Barriers to medications reviewed with patient.    Adverse reactions to current medications reviewed with patient..    Over the counter medications reviewed and reconciled with patient.  Review of Systems   Constitutional:  Negative for chills, diaphoresis, fever and weight loss.   HENT:  Negative for congestion, ear discharge, sinus pain and sore throat.    Eyes:  Negative for photophobia.   Respiratory:  Negative for cough, hemoptysis and shortness of breath.    Cardiovascular:  Negative for chest pain, palpitations, claudication, leg swelling and PND.   Gastrointestinal:  Negative for constipation, diarrhea, nausea and vomiting.   Genitourinary:  Negative for dysuria, frequency and urgency.   Musculoskeletal:  Negative for back pain, joint pain, myalgias and neck pain.   Skin:  Positive for itching and rash.   Neurological:  Negative for dizziness, focal weakness and headaches.   Psychiatric/Behavioral:  Negative for depression. The patient does not have insomnia.          Objective:     "  Vitals:    04/03/23 1032   BP: (!) 138/91   BP Location: Left arm   Patient Position: Sitting   Weight: 100.2 kg (221 lb)   Height: 5' 7" (1.702 m)       Physical Exam  Constitutional:       Appearance: Normal appearance.   HENT:      Head: Normocephalic and atraumatic.   Cardiovascular:      Rate and Rhythm: Normal rate and regular rhythm.      Pulses: Normal pulses.   Pulmonary:      Effort: Pulmonary effort is normal.      Breath sounds: Normal breath sounds.   Abdominal:      General: Abdomen is flat. Bowel sounds are normal. There is no distension.      Palpations: Abdomen is soft.      Tenderness: There is no abdominal tenderness.   Musculoskeletal:         General: No tenderness. Normal range of motion.      Right lower leg: No edema.      Left lower leg: No edema.   Lymphadenopathy:      Cervical: No cervical adenopathy.   Skin:     Findings: Rash present.      Comments: Rash present in both arms and abdomen    Neurological:      General: No focal deficit present.      Mental Status: He is alert and oriented to person, place, and time.             Assessment and Plan:       1. Primary hypertension  Assessment & Plan:  Uncontrolled  Will increase amlodipine to 10 mg QD  Will continue HCTZ 25 mg QD  Continue to check blood pressure at home  Will follow up in 3 weeks       2. Rash  Assessment & Plan:  Contact dermatitis from poison ivy  Will prescribe Medrol Dose Martin  Continue to take Benadryl as needed for itching  Call clinic if rash does not resolve after treatment       Other orders  -     hydroCHLOROthiazide (HYDRODIURIL) 25 MG tablet; Take 1 tablet (25 mg total) by mouth once daily.  Dispense: 30 tablet; Refill: 3  -     amLODIPine (NORVASC) 10 MG tablet; Take 1 tablet (10 mg total) by mouth once daily.  Dispense: 30 tablet; Refill: 3  -     methylPREDNISolone (MEDROL DOSEPACK) 4 mg tablet; use as directed  Dispense: 21 each; Refill: 0            Follow up: Follow up in about 3 weeks (around 4/24/2023) " for Bp follow up.

## 2023-04-05 NOTE — ASSESSMENT & PLAN NOTE
Contact dermatitis from poison ivy  Will prescribe Medrol Dose Martin  Continue to take Benadryl as needed for itching  Call clinic if rash does not resolve after treatment

## 2023-04-20 ENCOUNTER — TELEPHONE (OUTPATIENT)
Dept: INTERNAL MEDICINE | Facility: CLINIC | Age: 48
End: 2023-04-20
Payer: OTHER GOVERNMENT

## 2023-04-25 ENCOUNTER — OFFICE VISIT (OUTPATIENT)
Dept: INTERNAL MEDICINE | Facility: CLINIC | Age: 48
End: 2023-04-25
Payer: OTHER GOVERNMENT

## 2023-04-25 VITALS
BODY MASS INDEX: 34.53 KG/M2 | WEIGHT: 220 LBS | HEIGHT: 67 IN | SYSTOLIC BLOOD PRESSURE: 134 MMHG | RESPIRATION RATE: 16 BRPM | OXYGEN SATURATION: 95 % | DIASTOLIC BLOOD PRESSURE: 86 MMHG | HEART RATE: 96 BPM

## 2023-04-25 DIAGNOSIS — I10 PRIMARY HYPERTENSION: ICD-10-CM

## 2023-04-25 PROCEDURE — 99214 PR OFFICE/OUTPT VISIT, EST, LEVL IV, 30-39 MIN: ICD-10-PCS | Mod: ,,, | Performed by: STUDENT IN AN ORGANIZED HEALTH CARE EDUCATION/TRAINING PROGRAM

## 2023-04-25 PROCEDURE — 99214 OFFICE O/P EST MOD 30 MIN: CPT | Mod: ,,, | Performed by: STUDENT IN AN ORGANIZED HEALTH CARE EDUCATION/TRAINING PROGRAM

## 2023-04-25 NOTE — PROGRESS NOTES
Subjective:      Saad Lopez  2023  [id number]      Chief Complaint: Follow-up (B/p follow up)       HPI:  Mr Nash presents for blood pressure follow up. Patient is doing well, blood pressure has been better controlled since increasing amlodipine 10 mg.     Past Medical History:   Diagnosis Date    Carpal tunnel syndrome on right     Chronic pain     Hypertension     Mixed hyperlipidemia     Sleep apnea     Tinnitus      Past Surgical History:   Procedure Laterality Date    CARPAL TUNNEL RELEASE Right 2022    Procedure: RELEASE, CARPAL TUNNEL Right / Axillary Block;  Surgeon: Mason Palmer MD;  Location: [facility];  Service: General;  Laterality: Right;    EPIDURAL STEROID INJECTION  10/2021    2021    TYMPANOSTOMY TUBE PLACEMENT      x9     Family History   Problem Relation Age of Onset    Hypertension Mother     Skin cancer Father     Prostate cancer Father      Social History     Tobacco Use    Smoking status: Former     Packs/day: 0.50     Years: 10.00     Pack years: 5.00     Types: Cigarettes     Quit date:      Years since quittin.3    Smokeless tobacco: Never   Substance and Sexual Activity    Alcohol use: Not Currently    Drug use: Never    Sexual activity: Not Currently     Review of patient's allergies indicates:   Allergen Reactions    Pcn [penicillins] Swelling and Rash       The following were reviewed at this visit: active problem list, medication list, allergies, family history, social history, and health maintenance.    Medications:    Current Outpatient Medications:     amLODIPine (NORVASC) 10 MG tablet, Take 1 tablet (10 mg total) by mouth once daily., Disp: 30 tablet, Rfl: 3    cholecalciferol, vitamin D3, (VITAMIN D3) 50 mcg (2,000 unit) Tab, Take by mouth., Disp: , Rfl:     fluticasone propionate (FLONASE) 50 mcg/actuation nasal spray, 1 spray by Each Nostril route 2 (two) times daily., Disp: , Rfl:     hydroCHLOROthiazide (HYDRODIURIL) 25 MG tablet, Take  "1 tablet (25 mg total) by mouth once daily., Disp: 30 tablet, Rfl: 3    ipratropium (ATROVENT) 42 mcg (0.06 %) nasal spray, SMARTSI Spray(s) Both Nares 3 Times Daily PRN, Disp: , Rfl:     loratadine (CLARITIN) 10 mg tablet, Take 10 mg by mouth once daily., Disp: , Rfl:     tamsulosin (FLOMAX) 0.4 mg Cap, Take 1 capsule (0.4 mg total) by mouth once daily., Disp: 30 capsule, Rfl: 11    traZODone (DESYREL) 50 MG tablet, Take 25 mg by mouth., Disp: , Rfl:     triamcinolone (KENALOG) 0.5 % ointment, Apply topically., Disp: , Rfl:       Medications have been reviewed and reconciled with patient at this visit.  Barriers to medications reviewed with patient.    Adverse reactions to current medications reviewed with patient..    Over the counter medications reviewed and reconciled with patient.  Review of Systems   Constitutional:  Negative for chills, diaphoresis, fever and weight loss.   HENT:  Negative for congestion, ear discharge, sinus pain and sore throat.    Eyes:  Negative for photophobia.   Respiratory:  Negative for cough, hemoptysis and shortness of breath.    Cardiovascular:  Negative for chest pain, palpitations, claudication, leg swelling and PND.   Gastrointestinal:  Negative for constipation, diarrhea, nausea and vomiting.   Genitourinary:  Negative for dysuria, frequency and urgency.   Musculoskeletal:  Negative for back pain, joint pain, myalgias and neck pain.   Skin:  Negative for itching and rash.   Neurological:  Negative for dizziness, focal weakness and headaches.   Psychiatric/Behavioral:  Negative for depression. The patient does not have insomnia.          Objective:      Vitals:    23 1009   BP: 134/86   BP Location: Right arm   Patient Position: Sitting   BP Method: Large (Automatic)   Pulse: 96   Resp: 16   SpO2: 95%   Weight: 99.8 kg (220 lb)   Height: 5' 7" (1.702 m)       Physical Exam  Constitutional:       Appearance: Normal appearance.   HENT:      Head: Normocephalic and " atraumatic.   Cardiovascular:      Rate and Rhythm: Normal rate and regular rhythm.      Pulses: Normal pulses.   Pulmonary:      Effort: Pulmonary effort is normal.      Breath sounds: Normal breath sounds.   Abdominal:      General: Abdomen is flat. Bowel sounds are normal. There is no distension.      Palpations: Abdomen is soft.      Tenderness: There is no abdominal tenderness.   Musculoskeletal:         General: No tenderness. Normal range of motion.      Right lower leg: No edema.      Left lower leg: No edema.   Lymphadenopathy:      Cervical: No cervical adenopathy.   Neurological:      General: No focal deficit present.      Mental Status: He is alert and oriented to person, place, and time.             Assessment and Plan:       1. Primary hypertension  Assessment & Plan:  Controlled  Continue amlodipine 10 mg QD   Follow up in 3 months               Follow up: Follow up in about 3 months (around 7/25/2023) for Bp follow up.

## 2023-05-01 ENCOUNTER — PATIENT MESSAGE (OUTPATIENT)
Dept: ADMINISTRATIVE | Facility: HOSPITAL | Age: 48
End: 2023-05-01
Payer: OTHER GOVERNMENT

## 2023-07-19 ENCOUNTER — TELEPHONE (OUTPATIENT)
Dept: INTERNAL MEDICINE | Facility: CLINIC | Age: 48
End: 2023-07-19
Payer: OTHER GOVERNMENT

## 2023-07-24 ENCOUNTER — PATIENT MESSAGE (OUTPATIENT)
Dept: ADMINISTRATIVE | Facility: HOSPITAL | Age: 48
End: 2023-07-24
Payer: OTHER GOVERNMENT

## 2023-07-26 ENCOUNTER — OFFICE VISIT (OUTPATIENT)
Dept: INTERNAL MEDICINE | Facility: CLINIC | Age: 48
End: 2023-07-26
Payer: OTHER GOVERNMENT

## 2023-07-26 VITALS
HEART RATE: 74 BPM | OXYGEN SATURATION: 95 % | RESPIRATION RATE: 17 BRPM | DIASTOLIC BLOOD PRESSURE: 78 MMHG | BODY MASS INDEX: 35.47 KG/M2 | HEIGHT: 67 IN | SYSTOLIC BLOOD PRESSURE: 124 MMHG | WEIGHT: 226 LBS

## 2023-07-26 DIAGNOSIS — R06.02 SHORTNESS OF BREATH: ICD-10-CM

## 2023-07-26 DIAGNOSIS — R53.83 FATIGUE, UNSPECIFIED TYPE: ICD-10-CM

## 2023-07-26 DIAGNOSIS — I10 PRIMARY HYPERTENSION: ICD-10-CM

## 2023-07-26 PROCEDURE — 99214 OFFICE O/P EST MOD 30 MIN: CPT | Mod: ,,, | Performed by: STUDENT IN AN ORGANIZED HEALTH CARE EDUCATION/TRAINING PROGRAM

## 2023-07-26 PROCEDURE — 99214 PR OFFICE/OUTPT VISIT, EST, LEVL IV, 30-39 MIN: ICD-10-PCS | Mod: ,,, | Performed by: STUDENT IN AN ORGANIZED HEALTH CARE EDUCATION/TRAINING PROGRAM

## 2023-07-26 RX ORDER — ALBUTEROL SULFATE 90 UG/1
90 AEROSOL, METERED RESPIRATORY (INHALATION) 2 TIMES DAILY PRN
COMMUNITY
Start: 2022-11-27

## 2023-07-27 PROBLEM — R06.02 SHORTNESS OF BREATH: Status: ACTIVE | Noted: 2023-07-27

## 2023-07-27 NOTE — PROGRESS NOTES
Subjective:      Saad Lopez  2023  [id number]      Chief Complaint: Follow-up ( B/p follow up)       HPI:  Mr Nash presents for 3 months follow up. Blood pressure is well controlled, patient is complaining of shortness of breath with exertion, when walking long distances or when lying down, no lower extremity edema. No chest pain, cough, no other complaints.     Past Medical History:   Diagnosis Date    Carpal tunnel syndrome on right     Chronic pain     Hypertension     Mixed hyperlipidemia     Sleep apnea     Tinnitus      Past Surgical History:   Procedure Laterality Date    CARPAL TUNNEL RELEASE Right 2022    Procedure: RELEASE, CARPAL TUNNEL Right / Axillary Block;  Surgeon: Mason Palmer MD;  Location: [facility];  Service: General;  Laterality: Right;    EPIDURAL STEROID INJECTION  10/2021    2021    TYMPANOSTOMY TUBE PLACEMENT      x9     Family History   Problem Relation Age of Onset    Hypertension Mother     Skin cancer Father     Prostate cancer Father      Social History     Tobacco Use    Smoking status: Former     Packs/day: 0.50     Years: 10.00     Pack years: 5.00     Types: Cigarettes     Quit date:      Years since quittin.5    Smokeless tobacco: Never   Substance and Sexual Activity    Alcohol use: Not Currently    Drug use: Never    Sexual activity: Not Currently     Review of patient's allergies indicates:   Allergen Reactions    Pcn [penicillins] Swelling and Rash       The following were reviewed at this visit: active problem list, medication list, allergies, family history, social history, and health maintenance.    Medications:    Current Outpatient Medications:     albuterol (PROVENTIL/VENTOLIN HFA) 90 mcg/actuation inhaler, Inhale 90 g into the lungs 2 (two) times daily as needed., Disp: , Rfl:     amLODIPine (NORVASC) 10 MG tablet, Take 1 tablet (10 mg total) by mouth once daily., Disp: 30 tablet, Rfl: 3    cholecalciferol, vitamin D3, (VITAMIN  D3) 50 mcg (2,000 unit) Tab, Take by mouth., Disp: , Rfl:     fluticasone propionate (FLONASE) 50 mcg/actuation nasal spray, 1 spray by Each Nostril route 2 (two) times daily., Disp: , Rfl:     hydroCHLOROthiazide (HYDRODIURIL) 25 MG tablet, Take 1 tablet (25 mg total) by mouth once daily., Disp: 30 tablet, Rfl: 3    ipratropium (ATROVENT) 42 mcg (0.06 %) nasal spray, SMARTSI Spray(s) Both Nares 3 Times Daily PRN, Disp: , Rfl:     loratadine (CLARITIN) 10 mg tablet, Take 10 mg by mouth once daily., Disp: , Rfl:     tamsulosin (FLOMAX) 0.4 mg Cap, Take 1 capsule (0.4 mg total) by mouth once daily., Disp: 30 capsule, Rfl: 11    traZODone (DESYREL) 50 MG tablet, Take 25 mg by mouth., Disp: , Rfl:     triamcinolone (KENALOG) 0.5 % ointment, Apply topically., Disp: , Rfl:       Medications have been reviewed and reconciled with patient at this visit.  Barriers to medications reviewed with patient.    Adverse reactions to current medications reviewed with patient..    Over the counter medications reviewed and reconciled with patient.  Review of Systems   Constitutional:  Negative for chills, diaphoresis, fever and weight loss.   HENT:  Negative for congestion, ear discharge, sinus pain and sore throat.    Eyes:  Negative for photophobia.   Respiratory:  Positive for shortness of breath. Negative for cough and hemoptysis.    Cardiovascular:  Negative for chest pain, palpitations, claudication, leg swelling and PND.   Gastrointestinal:  Negative for constipation, diarrhea, nausea and vomiting.   Genitourinary:  Negative for dysuria, frequency and urgency.   Musculoskeletal:  Negative for back pain, joint pain, myalgias and neck pain.   Skin:  Negative for itching and rash.   Neurological:  Negative for dizziness, focal weakness and headaches.   Psychiatric/Behavioral:  Negative for depression. The patient does not have insomnia.          Objective:      Vitals:    23 1000   BP: 124/78   BP Location: Right arm  "  Patient Position: Sitting   BP Method: Small (Manual)   Pulse: 74   Resp: 17   SpO2: 95%   Weight: 102.5 kg (226 lb)   Height: 5' 7" (1.702 m)       Physical Exam  Constitutional:       Appearance: Normal appearance.   HENT:      Head: Normocephalic and atraumatic.   Cardiovascular:      Rate and Rhythm: Normal rate and regular rhythm.      Pulses: Normal pulses.   Pulmonary:      Effort: Pulmonary effort is normal.      Breath sounds: Normal breath sounds.   Abdominal:      General: Abdomen is flat. Bowel sounds are normal. There is no distension.      Palpations: Abdomen is soft.      Tenderness: There is no abdominal tenderness.   Musculoskeletal:         General: No tenderness. Normal range of motion.      Right lower leg: No edema.      Left lower leg: No edema.   Lymphadenopathy:      Cervical: No cervical adenopathy.   Neurological:      General: No focal deficit present.      Mental Status: He is alert and oriented to person, place, and time.             Assessment and Plan:       1. Shortness of breath  Assessment & Plan:  Patient is due for chest CT, ordered today  Will obtain Echo     Orders:  -     Echo; Future  -     CT Chest Without Contrast; Future; Expected date: 07/27/2023    2. Primary hypertension  Assessment & Plan:  Controlled since increasing amlodipine to 10 mg   Continue current medications       3. Fatigue, unspecified type  -     Testosterone Panel; Future; Expected date: 07/26/2023  -     TSH; Future; Expected date: 07/26/2023  -     T4, Free; Future; Expected date: 07/26/2023            Follow up: Follow up in about 3 months (around 10/26/2023) for Bp follow up.        "

## 2023-08-03 ENCOUNTER — HOSPITAL ENCOUNTER (OUTPATIENT)
Dept: CARDIOLOGY | Facility: HOSPITAL | Age: 48
Discharge: HOME | End: 2023-08-03
Attending: STUDENT IN AN ORGANIZED HEALTH CARE EDUCATION/TRAINING PROGRAM
Payer: OTHER GOVERNMENT

## 2023-08-03 DIAGNOSIS — R06.02 SHORTNESS OF BREATH: ICD-10-CM

## 2023-08-03 PROCEDURE — 93306 TTE W/DOPPLER COMPLETE: CPT | Mod: 26,,, | Performed by: INTERNAL MEDICINE

## 2023-08-03 PROCEDURE — 93306 TTE W/DOPPLER COMPLETE: CPT

## 2023-08-03 PROCEDURE — 93306 ECHO (CUPID ONLY): ICD-10-PCS | Mod: 26,,, | Performed by: INTERNAL MEDICINE

## 2023-08-04 LAB
AV INDEX (PROSTH): 0.93
AV MEAN GRADIENT: 3 MMHG
AV PEAK GRADIENT: 6 MMHG
AV VALVE AREA BY VELOCITY RATIO: 2.79 CM²
AV VALVE AREA: 2.93 CM²
AV VELOCITY RATIO: 0.89
CV ECHO LV RWT: 0.45 CM
DOP CALC AO PEAK VEL: 1.25 M/S
DOP CALC AO VTI: 23.5 CM
DOP CALC LVOT AREA: 3.1 CM2
DOP CALC LVOT DIAMETER: 2 CM
DOP CALC LVOT PEAK VEL: 1.11 M/S
DOP CALC LVOT STROKE VOLUME: 68.77 CM3
DOP CALC MV VTI: 33.5 CM
DOP CALCLVOT PEAK VEL VTI: 21.9 CM
E WAVE DECELERATION TIME: 185 MSEC
E/A RATIO: 1.15
E/E' RATIO: 10 M/S
ECHO LV POSTERIOR WALL: 1.04 CM (ref 0.6–1.1)
FRACTIONAL SHORTENING: 36 % (ref 28–44)
INTERVENTRICULAR SEPTUM: 0.93 CM (ref 0.6–1.1)
LEFT ATRIUM SIZE: 3.4 CM
LEFT ATRIUM VOLUME MOD: 41.7 CM3
LEFT INTERNAL DIMENSION IN SYSTOLE: 2.97 CM (ref 2.1–4)
LEFT VENTRICLE DIASTOLIC VOLUME: 98.3 ML
LEFT VENTRICLE SYSTOLIC VOLUME: 34.2 ML
LEFT VENTRICULAR INTERNAL DIMENSION IN DIASTOLE: 4.62 CM (ref 3.5–6)
LEFT VENTRICULAR MASS: 156.67 G
LV LATERAL E/E' RATIO: 8.75 M/S
LV SEPTAL E/E' RATIO: 11.67 M/S
LVOT MG: 3 MMHG
LVOT MV: 0.72 CM/S
MV MEAN GRADIENT: 2 MMHG
MV PEAK A VEL: 0.91 M/S
MV PEAK E VEL: 1.05 M/S
MV PEAK GRADIENT: 5 MMHG
MV STENOSIS PRESSURE HALF TIME: 54 MS
MV VALVE AREA BY CONTINUITY EQUATION: 2.05 CM2
MV VALVE AREA P 1/2 METHOD: 4.07 CM2
OHS LV EJECTION FRACTION SIMPSONS BIPLANE MOD: 6 %
PV PEAK GRADIENT: 4 MMHG
PV PEAK VELOCITY: 0.94 M/S
RA PRESSURE ESTIMATED: 3 MMHG
TDI LATERAL: 0.12 M/S
TDI SEPTAL: 0.09 M/S
TDI: 0.11 M/S
TRICUSPID ANNULAR PLANE SYSTOLIC EXCURSION: 2.29 CM

## 2023-08-09 ENCOUNTER — LAB VISIT (OUTPATIENT)
Dept: LAB | Facility: HOSPITAL | Age: 48
End: 2023-08-09
Attending: STUDENT IN AN ORGANIZED HEALTH CARE EDUCATION/TRAINING PROGRAM
Payer: OTHER GOVERNMENT

## 2023-08-09 DIAGNOSIS — R53.83 FATIGUE, UNSPECIFIED TYPE: ICD-10-CM

## 2023-08-09 DIAGNOSIS — Z00.00 WELLNESS EXAMINATION: ICD-10-CM

## 2023-08-09 LAB
ANION GAP SERPL CALC-SCNC: 2 MEQ/L
BASOPHILS # BLD AUTO: 0 X10(3)/MCL
BASOPHILS NFR BLD AUTO: 0 %
BUN SERPL-MCNC: 11.2 MG/DL (ref 8.9–20.6)
CALCIUM SERPL-MCNC: 9.4 MG/DL (ref 8.4–10.2)
CHLORIDE SERPL-SCNC: 107 MMOL/L (ref 98–107)
CHOLEST SERPL-MCNC: 196 MG/DL
CHOLEST/HDLC SERPL: 5 {RATIO} (ref 0–5)
CO2 SERPL-SCNC: 29 MMOL/L (ref 22–29)
CREAT SERPL-MCNC: 0.81 MG/DL (ref 0.73–1.18)
CREAT/UREA NIT SERPL: 14
EOSINOPHIL # BLD AUTO: 0 X10(3)/MCL (ref 0–0.9)
EOSINOPHIL NFR BLD AUTO: 0 %
ERYTHROCYTE [DISTWIDTH] IN BLOOD BY AUTOMATED COUNT: 12.7 % (ref 11.5–17)
GFR SERPLBLD CREATININE-BSD FMLA CKD-EPI: >60 MLS/MIN/1.73/M2
GLUCOSE SERPL-MCNC: 92 MG/DL (ref 74–100)
HCT VFR BLD AUTO: 50.2 % (ref 42–52)
HDLC SERPL-MCNC: 36 MG/DL (ref 35–60)
HGB BLD-MCNC: 15.5 G/DL (ref 14–18)
IMM GRANULOCYTES # BLD AUTO: 0.02 X10(3)/MCL (ref 0–0.04)
IMM GRANULOCYTES NFR BLD AUTO: 0.4 %
LDLC SERPL CALC-MCNC: 111 MG/DL (ref 50–140)
LYMPHOCYTES # BLD AUTO: 1.92 X10(3)/MCL (ref 0.6–4.6)
LYMPHOCYTES NFR BLD AUTO: 34.2 %
MCH RBC QN AUTO: 27.1 PG (ref 27–31)
MCHC RBC AUTO-ENTMCNC: 30.9 G/DL (ref 33–36)
MCV RBC AUTO: 87.6 FL (ref 80–94)
MONOCYTES # BLD AUTO: 0.84 X10(3)/MCL (ref 0.1–1.3)
MONOCYTES NFR BLD AUTO: 15 %
NEUTROPHILS # BLD AUTO: 2.83 X10(3)/MCL (ref 2.1–9.2)
NEUTROPHILS NFR BLD AUTO: 50.4 %
NRBC BLD AUTO-RTO: 0 %
PLATELET # BLD AUTO: 257 X10(3)/MCL (ref 130–400)
PMV BLD AUTO: 11.8 FL (ref 7.4–10.4)
POTASSIUM SERPL-SCNC: 4.4 MMOL/L (ref 3.5–5.1)
RBC # BLD AUTO: 5.73 X10(6)/MCL (ref 4.7–6.1)
SODIUM SERPL-SCNC: 138 MMOL/L (ref 136–145)
T4 FREE SERPL-MCNC: 1.01 NG/DL (ref 0.7–1.48)
TRIGL SERPL-MCNC: 245 MG/DL (ref 34–140)
TSH SERPL-ACNC: 1.9 UIU/ML (ref 0.35–4.94)
VLDLC SERPL CALC-MCNC: 49 MG/DL
WBC # SPEC AUTO: 5.61 X10(3)/MCL (ref 4.5–11.5)

## 2023-08-09 PROCEDURE — 36415 COLL VENOUS BLD VENIPUNCTURE: CPT

## 2023-08-09 PROCEDURE — 80061 LIPID PANEL: CPT

## 2023-08-09 PROCEDURE — 84439 ASSAY OF FREE THYROXINE: CPT

## 2023-08-09 PROCEDURE — 80048 BASIC METABOLIC PNL TOTAL CA: CPT

## 2023-08-09 PROCEDURE — 84443 ASSAY THYROID STIM HORMONE: CPT

## 2023-08-09 PROCEDURE — 85025 COMPLETE CBC W/AUTO DIFF WBC: CPT

## 2023-08-09 PROCEDURE — 84402 ASSAY OF FREE TESTOSTERONE: CPT

## 2023-08-11 LAB
SHBG SERPL-SCNC: 25 NMOL/L
TESTOST FREE MFR SERPL: 2.2 %
TESTOST FREE SERPL-MCNC: 76 PG/ML
TESTOST SERPL-MCNC: 355 NG/DL
TESTOSTERONE.FREE+WB SERPL-MCNC: 204 NG/DL

## 2023-08-27 NOTE — PROGRESS NOTES
Please inform patient of results.    Echo is showing normal ejection fraction, it is showing mild decrease in movement of heart, will need referral to Cardiology for further evaluation. Please ask patient where he would like to be referred to.

## 2023-08-28 ENCOUNTER — TELEPHONE (OUTPATIENT)
Dept: INTERNAL MEDICINE | Facility: CLINIC | Age: 48
End: 2023-08-28
Payer: OTHER GOVERNMENT

## 2023-08-28 DIAGNOSIS — R06.02 SHORTNESS OF BREATH: Primary | ICD-10-CM

## 2023-08-28 DIAGNOSIS — I10 PRIMARY HYPERTENSION: ICD-10-CM

## 2023-08-28 NOTE — TELEPHONE ENCOUNTER
----- Message from Aislinn Farr MD sent at 8/26/2023  7:16 PM CDT -----  Please inform patient of results.    Echo is showing normal ejection fraction, it is showing mild decrease in movement of heart, will need referral to Cardiology for further evaluation. Please ask patient where he would like to be referred to.

## 2023-09-05 ENCOUNTER — HOSPITAL ENCOUNTER (OUTPATIENT)
Dept: RADIOLOGY | Facility: HOSPITAL | Age: 48
Discharge: HOME | End: 2023-09-05
Attending: STUDENT IN AN ORGANIZED HEALTH CARE EDUCATION/TRAINING PROGRAM
Payer: OTHER GOVERNMENT

## 2023-09-05 DIAGNOSIS — R06.02 SHORTNESS OF BREATH: ICD-10-CM

## 2023-09-05 PROCEDURE — 71250 CT THORAX DX C-: CPT | Mod: TC

## 2023-09-15 DIAGNOSIS — I10 PRIMARY HYPERTENSION: Primary | ICD-10-CM

## 2023-09-15 RX ORDER — HYDROCHLOROTHIAZIDE 25 MG/1
25 TABLET ORAL
Qty: 30 TABLET | Refills: 3 | Status: SHIPPED | OUTPATIENT
Start: 2023-09-15

## 2023-09-15 RX ORDER — AMLODIPINE BESYLATE 10 MG/1
10 TABLET ORAL
Qty: 30 TABLET | Refills: 3 | Status: SHIPPED | OUTPATIENT
Start: 2023-09-15

## 2023-10-25 ENCOUNTER — TELEPHONE (OUTPATIENT)
Dept: INTERNAL MEDICINE | Facility: CLINIC | Age: 48
End: 2023-10-25
Payer: OTHER GOVERNMENT

## 2023-10-25 ENCOUNTER — PATIENT MESSAGE (OUTPATIENT)
Dept: ADMINISTRATIVE | Facility: HOSPITAL | Age: 48
End: 2023-10-25
Payer: OTHER GOVERNMENT

## 2023-10-25 DIAGNOSIS — R06.02 SHORTNESS OF BREATH: Primary | ICD-10-CM

## 2023-10-25 DIAGNOSIS — R91.8 ABNORMAL CT SCAN, LUNG: ICD-10-CM

## 2023-10-25 NOTE — TELEPHONE ENCOUNTER
Patient needs follow up appointment, he was to follow after 3 months after visit in July. Valsartan is ok to start. In regards to shortness of breath will need to do pulmonary function test to evaluate for cause of shortness of breath. Echo was normal. CT chest shows stable nodules, is due for repeat this month, will order it and they will call him to schedule appointment

## 2023-10-25 NOTE — LETTER
AUTHORIZATION FOR RELEASE OF   CONFIDENTIAL INFORMATION    Dear medical records,    We are seeing Saad Lopez, date of birth 1975, in the clinic at [address]. Aislinn Yanes MD is the patient's PCP. Saad Lopez has an outstanding lab/procedure at the time we reviewed his chart. In order to help keep his health information updated, he has authorized us to request the following medical record(s):        (  )  MAMMOGRAM                                      ( X )  COLONOSCOPY      (  )  PAP SMEAR                                          (  )  OUTSIDE LAB RESULTS     (  )  DEXA SCAN                                          (  )  EYE EXAM            (  )  FOOT EXAM                                          (  )  ENTIRE RECORD     (  )  OUTSIDE IMMUNIZATIONS                 (  )  _______________         Please fax records to Aislinn Yanes MD, [phone]     If you have any questions, please contact us at [phone].           Patient Name: Saad Lopez  : 1975  Patient Phone #: [phone]

## 2023-10-25 NOTE — TELEPHONE ENCOUNTER
Patient was given Valsartan 160mg by the cardiologist and would like your opinion on this drug    All test were normal wants to know if you think the inhaler he is on is not strong enough. Works for only a short period and then he has to use it again.

## 2023-10-26 NOTE — TELEPHONE ENCOUNTER
Spoke to patient he will start the Valsartan, he has an appt with his cardiologist and will discuss with him the BP, he is on a fixed income and can not get another test done at this time, wants to wait on the pulmonary test and the other CT

## 2024-07-03 ENCOUNTER — OFFICE VISIT (OUTPATIENT)
Dept: PRIMARY CARE CLINIC | Facility: CLINIC | Age: 49
End: 2024-07-03
Payer: OTHER GOVERNMENT

## 2024-07-03 VITALS
BODY MASS INDEX: 37.2 KG/M2 | HEART RATE: 82 BPM | OXYGEN SATURATION: 98 % | WEIGHT: 237 LBS | HEIGHT: 67 IN | RESPIRATION RATE: 18 BRPM | TEMPERATURE: 98 F | DIASTOLIC BLOOD PRESSURE: 81 MMHG | SYSTOLIC BLOOD PRESSURE: 139 MMHG

## 2024-07-03 DIAGNOSIS — Z11.4 SCREENING FOR HIV (HUMAN IMMUNODEFICIENCY VIRUS): ICD-10-CM

## 2024-07-03 DIAGNOSIS — I10 PRIMARY HYPERTENSION: Primary | ICD-10-CM

## 2024-07-03 DIAGNOSIS — Z11.59 NEED FOR HEPATITIS C SCREENING TEST: ICD-10-CM

## 2024-07-03 DIAGNOSIS — R91.8 MULTIPLE LUNG NODULES ON CT: ICD-10-CM

## 2024-07-03 DIAGNOSIS — Z12.5 SCREENING FOR MALIGNANT NEOPLASM OF PROSTATE: ICD-10-CM

## 2024-07-03 DIAGNOSIS — Z00.00 WELLNESS EXAMINATION: ICD-10-CM

## 2024-07-03 DIAGNOSIS — E29.1 HYPOGONADISM IN MALE: ICD-10-CM

## 2024-07-03 PROCEDURE — 99214 OFFICE O/P EST MOD 30 MIN: CPT | Mod: ,,, | Performed by: STUDENT IN AN ORGANIZED HEALTH CARE EDUCATION/TRAINING PROGRAM

## 2024-07-03 RX ORDER — PREDNISONE 20 MG/1
20 TABLET ORAL 2 TIMES DAILY
COMMUNITY
Start: 2024-07-01 | End: 2024-07-06

## 2024-07-03 RX ORDER — AMLODIPINE BESYLATE 5 MG/1
5 TABLET ORAL DAILY
Qty: 30 TABLET | Refills: 11 | Status: SHIPPED | OUTPATIENT
Start: 2024-07-03 | End: 2025-06-28

## 2024-07-03 RX ORDER — VALSARTAN 160 MG/1
160 TABLET ORAL DAILY
COMMUNITY
Start: 2023-10-25

## 2024-07-03 RX ORDER — METHOCARBAMOL 750 MG/1
750 TABLET, FILM COATED ORAL 4 TIMES DAILY
COMMUNITY
Start: 2024-07-01 | End: 2024-07-08

## 2024-07-03 RX ORDER — ANASTROZOLE 1 MG/1
1 TABLET ORAL
COMMUNITY
Start: 2023-12-12

## 2024-07-03 NOTE — ASSESSMENT & PLAN NOTE
Managed by [facility]   Current dose is 200 mg testosterone cypionate   Also taking anastrazole (2 to 3 mg weekly)

## 2024-07-03 NOTE — PROGRESS NOTES
Subjective:       Patient ID: Saad Lopez is a 49 y.o. male.    -------------------------------------    Carpal tunnel syndrome on right    Chronic pain    Hypertension    Mixed hyperlipidemia    Sleep apnea    Tinnitus        Chief Complaint: Establish Care    Presents to establish care.    HTN - was started on amlodipine 5 mg had some SOB. SOB worsened when dose was increased to 10 mg.     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 Managed with OTC meds for 2 years. First x-ray 2018 after injuring during dead lift. Had PT as well. Underwent lumbar injections in 2021. For cervical disease sees chiropractor. Has not had surgery. Back pain is L lower back and L buttocks. Neck pain is localized to neck w/o radiation.       Review of Systems   Constitutional:  Negative for chills and fever.   HENT:  Negative for sinus pressure/congestion and sore throat.    Respiratory:  Negative for cough, shortness of breath and wheezing.    Cardiovascular:  Negative for chest pain and leg swelling.   Gastrointestinal:  Negative for abdominal pain, nausea and vomiting.   Genitourinary:  Negative for dysuria and hematuria.   Musculoskeletal:  Positive for back pain and neck pain.   Integumentary:  Negative for rash.   Neurological:  Negative for dizziness and syncope.   Hematological:  Negative for adenopathy.   Psychiatric/Behavioral:  Negative for confusion. The patient is not nervous/anxious.            Objective:      Physical Exam  Vitals and nursing note reviewed.   Constitutional:       General: He is not in acute distress.     Appearance: He is obese.   HENT:      Head: Normocephalic.      Nose: No rhinorrhea.   Eyes:      Conjunctiva/sclera: Conjunctivae normal.      Pupils: Pupils are equal, round, and reactive to light.   Cardiovascular:      Rate and Rhythm: Normal rate and regular rhythm.   Pulmonary:      Effort: Pulmonary effort is normal.      Breath sounds: Normal breath sounds.    Abdominal:      Palpations: Abdomen is soft.      Tenderness: There is no abdominal tenderness.   Musculoskeletal:         General: No deformity or signs of injury.   Skin:     General: Skin is warm and dry.   Neurological:      General: No focal deficit present.      Mental Status: He is alert. Mental status is at baseline.   Psychiatric:         Mood and Affect: Mood normal.         Behavior: Behavior normal.           Assessment & Plan:   1. Primary hypertension  Assessment & Plan:  Pt would like to try amlodipine again to see if SOB occurs. See HPI.  Start amlodipine 5 mg daily    Orders:  -     amLODIPine (NORVASC) 5 MG tablet; Take 1 tablet (5 mg total) by mouth once daily.  Dispense: 30 tablet; Refill: 11  -     CBC Auto Differential; Future; Expected date: 08/28/2024  -     Comprehensive Metabolic Panel; Future; Expected date: 08/28/2024    2. Hypogonadism in male  Assessment & Plan:  Managed by [facility]   Current dose is 200 mg testosterone cypionate   Also taking anastrazole (2 to 3 mg weekly)      3. Multiple lung nodules on CT  Assessment & Plan:  See CT 9/5/23  Will obtain f/u scan at 1 year (Sept 2024)    Orders:  -     CT Chest Without Contrast; Future; Expected date: 09/03/2024    4. Screening for HIV (human immunodeficiency virus)  -     HIV 1/2 Ag/Ab (4th Gen); Future; Expected date: 08/28/2024    5. Need for hepatitis C screening test  -     Hepatitis C Antibody; Future; Expected date: 08/28/2024    6. Wellness examination  -     CBC Auto Differential; Future; Expected date: 08/28/2024  -     Comprehensive Metabolic Panel; Future; Expected date: 08/28/2024  -     Hemoglobin A1C; Future; Expected date: 08/28/2024  -     Lipid Panel; Future; Expected date: 08/28/2024  -     TSH; Future; Expected date: 08/28/2024  -     Urinalysis, Reflex to Urine Culture; Future; Expected date: 08/28/2024    7. Screening for malignant neoplasm of prostate  -     PSA, Screening; Future; Expected date:  08/28/2024        Follow up in about 2 months (around 9/3/2024) for Wellness. In addition to their scheduled follow up, the patient has also been instructed to follow up on as needed basis.

## 2024-08-26 ENCOUNTER — TELEPHONE (OUTPATIENT)
Dept: PRIMARY CARE CLINIC | Facility: CLINIC | Age: 49
End: 2024-08-26
Payer: OTHER GOVERNMENT

## 2024-08-26 NOTE — TELEPHONE ENCOUNTER
No answer/left message on 8/26/24 at 9:53 am reminding patient about upcoming visit with labs needed prior to appointment. Lab order in chart. Lab orders need to be completed at least by 1 day before visit.

## 2024-09-03 ENCOUNTER — TELEPHONE (OUTPATIENT)
Dept: PRIMARY CARE CLINIC | Facility: CLINIC | Age: 49
End: 2024-09-03
Payer: OTHER GOVERNMENT

## 2024-09-03 ENCOUNTER — OFFICE VISIT (OUTPATIENT)
Dept: PRIMARY CARE CLINIC | Facility: CLINIC | Age: 49
End: 2024-09-03
Payer: OTHER GOVERNMENT

## 2024-09-03 VITALS
RESPIRATION RATE: 18 BRPM | HEART RATE: 90 BPM | OXYGEN SATURATION: 98 % | DIASTOLIC BLOOD PRESSURE: 82 MMHG | HEIGHT: 67 IN | WEIGHT: 240 LBS | TEMPERATURE: 98 F | SYSTOLIC BLOOD PRESSURE: 125 MMHG | BODY MASS INDEX: 37.67 KG/M2

## 2024-09-03 DIAGNOSIS — E66.01 CLASS 2 SEVERE OBESITY DUE TO EXCESS CALORIES WITH SERIOUS COMORBIDITY AND BODY MASS INDEX (BMI) OF 37.0 TO 37.9 IN ADULT: ICD-10-CM

## 2024-09-03 DIAGNOSIS — E29.1 HYPOGONADISM IN MALE: ICD-10-CM

## 2024-09-03 DIAGNOSIS — Z00.00 WELLNESS EXAMINATION: Primary | ICD-10-CM

## 2024-09-03 PROBLEM — E66.812 CLASS 2 SEVERE OBESITY DUE TO EXCESS CALORIES WITH SERIOUS COMORBIDITY AND BODY MASS INDEX (BMI) OF 37.0 TO 37.9 IN ADULT: Status: ACTIVE | Noted: 2024-09-03

## 2024-09-03 LAB
BACTERIA #/AREA URNS AUTO: NORMAL /HPF
BILIRUB UR QL STRIP.AUTO: NEGATIVE
CLARITY UR: CLEAR
COLOR UR AUTO: NORMAL
GLUCOSE UR QL STRIP: NORMAL
HGB UR QL STRIP: NEGATIVE
KETONES UR QL STRIP: NEGATIVE
LEUKOCYTE ESTERASE UR QL STRIP: NEGATIVE
NITRITE UR QL STRIP: NEGATIVE
PH UR STRIP: 6 [PH]
PROT UR QL STRIP: NEGATIVE
RBC #/AREA URNS AUTO: NORMAL /HPF
SP GR UR STRIP.AUTO: 1.01 (ref 1–1.03)
SQUAMOUS #/AREA URNS LPF: NORMAL /HPF
UROBILINOGEN UR STRIP-ACNC: NORMAL
WBC #/AREA URNS AUTO: NORMAL /HPF

## 2024-09-03 PROCEDURE — 99396 PREV VISIT EST AGE 40-64: CPT | Mod: ,,, | Performed by: STUDENT IN AN ORGANIZED HEALTH CARE EDUCATION/TRAINING PROGRAM

## 2024-09-03 RX ORDER — TIRZEPATIDE 2.5 MG/.5ML
2.5 INJECTION, SOLUTION SUBCUTANEOUS
Qty: 2 ML | Refills: 11 | Status: SHIPPED | OUTPATIENT
Start: 2024-09-03

## 2024-09-03 RX ORDER — TESTOSTERONE CYPIONATE 200 MG/ML
200 INJECTION, SOLUTION INTRAMUSCULAR
COMMUNITY
Start: 2024-08-28

## 2024-09-03 NOTE — PROGRESS NOTES
ANNUAL WELLNESS NOTE    Chief Complaint:  Wellness     HPI:  Saad Lopez is a 49 y.o. male    -------------------------------------    Carpal tunnel syndrome on right    Chronic pain    Hypertension    Mixed hyperlipidemia    Sleep apnea    Tinnitus       Patient Care Team:  Scot Castorena MD as PCP - General (Internal Medicine)  Ben Dawson MD as Consulting Physician (Cardiology)    Presents today for wellness visit. Describes overall health as good. Diet is balanced, working on cutting back fast food. Exercises never. Tobacco use: quit smoking 4/1/21 (20 years < 1/2 ppd). Alcohol use: rare (special occasion). Caffeine intake: 2-3 caffeinated drinks daily. Eye exam: annually (wears glasses). Dental exam: twice annually.    Review of Systems   Constitutional:  Negative for chills and fever.   HENT:  Negative for sinus pressure/congestion and sore throat.    Respiratory:  Negative for cough, shortness of breath and wheezing.    Cardiovascular:  Negative for chest pain and leg swelling.   Gastrointestinal:  Negative for abdominal pain, nausea and vomiting.   Genitourinary:  Negative for dysuria and hematuria.   Musculoskeletal:  Negative for arthralgias and myalgias.   Integumentary:  Negative for rash.   Neurological:  Negative for dizziness and syncope.   Hematological:  Negative for adenopathy.   Psychiatric/Behavioral:  Negative for confusion. The patient is not nervous/anxious.        Physical Exam  Vitals and nursing note reviewed.   Constitutional:       General: He is not in acute distress.     Appearance: He is obese.   HENT:      Head: Normocephalic.      Nose: No rhinorrhea.   Eyes:      Conjunctiva/sclera: Conjunctivae normal.      Pupils: Pupils are equal, round, and reactive to light.   Cardiovascular:      Rate and Rhythm: Normal rate and regular rhythm.   Pulmonary:      Effort: Pulmonary effort is normal.      Breath sounds: Normal breath sounds.   Abdominal:      Palpations:  Abdomen is soft.      Tenderness: There is no abdominal tenderness.   Musculoskeletal:         General: No deformity or signs of injury.   Skin:     General: Skin is warm and dry.   Neurological:      General: No focal deficit present.      Mental Status: He is alert. Mental status is at baseline.   Psychiatric:         Mood and Affect: Mood normal.         Behavior: Behavior normal.       Assessment/Plan:  1. Wellness examination  Assessment & Plan:  Reviewed recent wellness labs. See below for health maintenance.    Vaccinations:   - Flu: due this fall, typically does receive   - Pneumonia:    - Shingles (>50):    - Tdap: due 2027   - COVID: received x 2  Screening:   - Prostate (>45): PSA normal, repeat annually    - Lung Ca. Screening (50-80 with >20 pack yrs current or quit <15 yrs):    - Colon Ca. Screening (>45): reports colonoscopy 2-3 years ago. [facility]   - AAA (65-75 if ever smoked):    - Cardiac:    - Hep. C, HIV: screening negative 2024    Orders:  -     Urinalysis, Reflex to Urine Culture; Future; Expected date: 09/03/2024    2. Hypogonadism in male  Assessment & Plan:  Has reduced to 150 mg weekly. Pt wants to get off T replacement. Recommended slow taper to allow natural testosterone levels to rise slowly. Defer management to [place]      3. Class 2 severe obesity due to excess calories with serious comorbidity and body mass index (BMI) of 37.0 to 37.9 in adult  Assessment & Plan:  Chronic issue. Worsening.  Weight slowly/steadily increasing.  He's working on dietary improvements but has not had much success yet.   Discussed options. He's a good candidate for GLP1 like Zepbound. Discussed risks and side effects. Sending Rx for Zepbound 2.5 mg weekly. Likely increase to 5 mg after 4 weeks if tolerated.     Orders:  -     tirzepatide, weight loss, (ZEPBOUND) 2.5 mg/0.5 mL PnIj; Inject 2.5 mg into the skin every 7 days.  Dispense: 2 mL; Refill: 11      Follow up in about 3  months (around 12/3/2024) for Weight Loss.

## 2024-09-03 NOTE — ASSESSMENT & PLAN NOTE
Has reduced to 150 mg weekly. Pt wants to get off T replacement. Recommended slow taper to allow natural testosterone levels to rise slowly. Defer management to [place]

## 2024-09-03 NOTE — ASSESSMENT & PLAN NOTE
Chronic issue. Worsening.  Weight slowly/steadily increasing.  He's working on dietary improvements but has not had much success yet.   Discussed options. He's a good candidate for GLP1 like Zepbound. Discussed risks and side effects. Sending Rx for Zepbound 2.5 mg weekly. Likely increase to 5 mg after 4 weeks if tolerated.

## 2024-09-03 NOTE — LETTER
AUTHORIZATION FOR RELEASE OF   CONFIDENTIAL INFORMATION    Dear Medical Records,    We are seeing Saad Lopez, date of birth 1975, in the clinic at [facility]. Scot Castorena MD is the patient's PCP. Sada Lopez has an outstanding lab/procedure at the time we reviewed his chart. In order to help keep his health information updated, he has authorized us to request the following medical record(s):        (  )  MAMMOGRAM                                      (X)  COLONOSCOPY      (  )  PAP SMEAR                                          (  )  OUTSIDE LAB RESULTS     (  )  DEXA SCAN                                          (  )  EYE EXAM            (  )  FOOT EXAM                                          (  )  ENTIRE RECORD     (  )  OUTSIDE IMMUNIZATIONS                 (  )  _______________         Please fax records to [facility], [place], Jeffrey D, MD, [phone]    Patient Name: Saad Lopez  : 1975  Patient Phone #: [phone]

## 2024-09-03 NOTE — ASSESSMENT & PLAN NOTE
Reviewed recent wellness labs. See below for health maintenance.    Vaccinations:   - Flu: due this fall, typically does receive   - Pneumonia:    - Shingles (>50):    - Tdap: due 2027   - COVID: received x 2  Screening:   - Prostate (>45): PSA normal, repeat annually    - Lung Ca. Screening (50-80 with >20 pack yrs current or quit <15 yrs):    - Colon Ca. Screening (>45): reports colonoscopy 2-3 years ago. [facility]   - AAA (65-75 if ever smoked):    - Cardiac:    - Hep. C, HIV: screening negative 2024

## 2024-09-04 ENCOUNTER — TELEPHONE (OUTPATIENT)
Dept: PRIMARY CARE CLINIC | Facility: CLINIC | Age: 49
End: 2024-09-04
Payer: OTHER GOVERNMENT

## 2024-09-04 DIAGNOSIS — G47.00 INSOMNIA, UNSPECIFIED TYPE: Primary | ICD-10-CM

## 2024-09-04 PROBLEM — R21 RASH: Status: RESOLVED | Noted: 2023-04-05 | Resolved: 2024-09-04

## 2024-09-04 RX ORDER — TRAZODONE HYDROCHLORIDE 50 MG/1
50 TABLET ORAL NIGHTLY PRN
Qty: 30 TABLET | Refills: 11 | Status: SHIPPED | OUTPATIENT
Start: 2024-09-04

## 2024-09-04 NOTE — TELEPHONE ENCOUNTER
Spoke with pt. Advised that Zepbound was denied by insurance. Appeal in process. Pt verbalized understanding.

## 2024-09-04 NOTE — TELEPHONE ENCOUNTER
----- Message from Scot Castorena MD sent at 9/4/2024  7:38 AM CDT -----  [facility].    Thanks  Scot Castorena MD

## 2024-09-04 NOTE — TELEPHONE ENCOUNTER
----- Message from Zakia Rogel sent at 9/4/2024  1:05 PM CDT -----  Regarding: med refill  .Who Called: Saad Lopez    Refill or New Rx:Refill  RX Name and Strength:traZODone (DESYREL) 50 MG tablet  How is the patient currently taking it? (ex. 1XDay):1xday  Is this a 30 day or 90 day RX:30  Local or Mail Order:local  List of preferred pharmacies on file (remove unneeded): [id number] - [place], [place] - [address] AT [facility] [address]   Phone: [phone]  Fax:[phone]  Ordering Provider:Raffaele Ellison      Preferred Method of Contact: Phone Call  Patient's Preferred Phone Number on File: [phone]   Best Call Back Number, if different:  Additional Information: pt needs authorization from  to refill prescription

## 2024-09-06 ENCOUNTER — DOCUMENTATION ONLY (OUTPATIENT)
Dept: PRIMARY CARE CLINIC | Facility: CLINIC | Age: 49
End: 2024-09-06
Payer: OTHER GOVERNMENT

## 2024-10-16 ENCOUNTER — CLINICAL SUPPORT (OUTPATIENT)
Dept: PRIMARY CARE CLINIC | Facility: CLINIC | Age: 49
End: 2024-10-16
Payer: OTHER GOVERNMENT

## 2024-10-16 DIAGNOSIS — Z23 NEED FOR INFLUENZA VACCINATION: Primary | ICD-10-CM

## 2024-10-16 PROCEDURE — 90656 IIV3 VACC NO PRSV 0.5 ML IM: CPT | Mod: ,,, | Performed by: STUDENT IN AN ORGANIZED HEALTH CARE EDUCATION/TRAINING PROGRAM

## 2024-10-16 PROCEDURE — 90471 IMMUNIZATION ADMIN: CPT | Mod: ,,, | Performed by: STUDENT IN AN ORGANIZED HEALTH CARE EDUCATION/TRAINING PROGRAM

## 2024-10-16 NOTE — PROGRESS NOTES
Pt arrived for Flu Vaccination.  Confirmed pt's name, , allergies.  Injection given into pt's left deltoid.   Pt tolerated injection well.  Pt instructed to wait in lobby area for additional 15 minutes.   No adverse reaction noted.

## 2024-11-25 ENCOUNTER — TELEPHONE (OUTPATIENT)
Dept: PRIMARY CARE CLINIC | Facility: CLINIC | Age: 49
End: 2024-11-25
Payer: OTHER GOVERNMENT

## 2024-12-03 ENCOUNTER — OFFICE VISIT (OUTPATIENT)
Dept: PRIMARY CARE CLINIC | Facility: CLINIC | Age: 49
End: 2024-12-03
Payer: OTHER GOVERNMENT

## 2024-12-03 DIAGNOSIS — E66.812 CLASS 2 SEVERE OBESITY DUE TO EXCESS CALORIES WITH SERIOUS COMORBIDITY AND BODY MASS INDEX (BMI) OF 37.0 TO 37.9 IN ADULT: Primary | ICD-10-CM

## 2024-12-03 DIAGNOSIS — E66.01 CLASS 2 SEVERE OBESITY DUE TO EXCESS CALORIES WITH SERIOUS COMORBIDITY AND BODY MASS INDEX (BMI) OF 37.0 TO 37.9 IN ADULT: Primary | ICD-10-CM

## 2024-12-03 DIAGNOSIS — M19.90 OSTEOARTHRITIS, UNSPECIFIED OSTEOARTHRITIS TYPE, UNSPECIFIED SITE: ICD-10-CM

## 2024-12-03 RX ORDER — SEMAGLUTIDE 0.25 MG/.5ML
0.25 INJECTION, SOLUTION SUBCUTANEOUS
Qty: 2 ML | Refills: 11 | Status: SHIPPED | OUTPATIENT
Start: 2024-12-03

## 2024-12-03 RX ORDER — PHENTERMINE HYDROCHLORIDE 37.5 MG/1
37.5 TABLET ORAL
Qty: 30 TABLET | Refills: 2 | Status: SHIPPED | OUTPATIENT
Start: 2024-12-03

## 2024-12-03 RX ORDER — CELECOXIB 200 MG/1
200 CAPSULE ORAL DAILY PRN
Qty: 30 CAPSULE | Refills: 2 | Status: SHIPPED | OUTPATIENT
Start: 2024-12-03

## 2024-12-03 NOTE — PROGRESS NOTES
Subjective:       Patient ID: Saad Lopez is a 49 y.o. male.    -------------------------------------    Carpal tunnel syndrome on right    Chronic pain    Hypertension    Mixed hyperlipidemia    Sleep apnea    Tinnitus        Chief Complaint: Follow-up    Obesity - Zepbound denied. Weight unchanged.     C/o diffuse joint pains, sporadic, worsening with age, exacerbated by overuse. Friend had success with Celebrex, and pt wondering if he could try same.      Review of Systems   Constitutional:  Negative for chills and fever.   HENT:  Negative for sinus pressure/congestion and sore throat.    Respiratory:  Negative for cough, shortness of breath and wheezing.    Cardiovascular:  Negative for chest pain and leg swelling.   Gastrointestinal:  Negative for abdominal pain, nausea and vomiting.   Genitourinary:  Negative for dysuria and hematuria.   Musculoskeletal:  Positive for arthralgias. Negative for myalgias.   Integumentary:  Negative for rash.   Neurological:  Negative for dizziness and syncope.   Hematological:  Negative for adenopathy.   Psychiatric/Behavioral:  Negative for confusion. The patient is not nervous/anxious.            Objective:      Physical Exam  Vitals and nursing note reviewed.   Constitutional:       General: He is not in acute distress.     Appearance: He is obese.   HENT:      Head: Normocephalic.      Nose: No rhinorrhea.   Eyes:      Conjunctiva/sclera: Conjunctivae normal.      Pupils: Pupils are equal, round, and reactive to light.   Cardiovascular:      Rate and Rhythm: Normal rate and regular rhythm.   Pulmonary:      Effort: Pulmonary effort is normal.      Breath sounds: Normal breath sounds.   Abdominal:      Palpations: Abdomen is soft.      Tenderness: There is no abdominal tenderness.   Musculoskeletal:         General: No deformity or signs of injury.   Skin:     General: Skin is warm and dry.   Neurological:      General: No focal deficit present.      Mental  Status: He is alert. Mental status is at baseline.   Psychiatric:         Mood and Affect: Mood normal.         Behavior: Behavior normal.           Assessment & Plan:   1. Class 2 severe obesity due to excess calories with serious comorbidity and body mass index (BMI) of 37.0 to 37.9 in adult  Assessment & Plan:  Zepbound denied. According to 's website, Wegovy may be covered. We'll send Rx for Wegovy 0.25 mg weekly. Anticipate this will take time to get approved. So, we'll also start trial of Adipex. Counseled on potential risks. Recommended he try half tab first to ensure tolerance. If Wegovy approved, pt instructed to stop Adipex.    Orders:  -     semaglutide, weight loss, (WEGOVY) 0.25 mg/0.5 mL PnIj; Inject 0.25 mg into the skin every 7 days.  Dispense: 2 mL; Refill: 11  -     phentermine (ADIPEX-P) 37.5 mg tablet; Take 1 tablet (37.5 mg total) by mouth before breakfast.  Dispense: 30 tablet; Refill: 2    2. Osteoarthritis, unspecified osteoarthritis type, unspecified site  Assessment & Plan:  Tried osteo biflex and turmeric without benefit.     Orders:  -     celecoxib (CELEBREX) 200 MG capsule; Take 1 capsule (200 mg total) by mouth daily as needed for Pain.  Dispense: 30 capsule; Refill: 2          Follow up in about 3 months (around 3/3/2025) for Weight Loss. In addition to their scheduled follow up, the patient has also been instructed to follow up on as needed basis.

## 2024-12-04 NOTE — ASSESSMENT & PLAN NOTE
Zepbound denied. According to 's website, Wegovy may be covered. We'll send Rx for Wegovy 0.25 mg weekly. Anticipate this will take time to get approved. So, we'll also start trial of Adipex. Counseled on potential risks. Recommended he try half tab first to ensure tolerance. If Wegovy approved, pt instructed to stop Adipex.

## 2024-12-18 ENCOUNTER — TELEPHONE (OUTPATIENT)
Dept: FAMILY MEDICINE | Facility: CLINIC | Age: 49
End: 2024-12-18
Payer: OTHER GOVERNMENT

## 2024-12-19 NOTE — TELEPHONE ENCOUNTER
Patient verbalized understanding and was thankful. He feels it is a bit strong in the AM so he thinks 1/2 and 1/2 will do better. He will call us with any other s/s.

## 2025-03-03 ENCOUNTER — OFFICE VISIT (OUTPATIENT)
Dept: PRIMARY CARE CLINIC | Facility: CLINIC | Age: 50
End: 2025-03-03
Payer: OTHER GOVERNMENT

## 2025-03-03 VITALS
WEIGHT: 231 LBS | SYSTOLIC BLOOD PRESSURE: 126 MMHG | OXYGEN SATURATION: 98 % | TEMPERATURE: 99 F | HEIGHT: 67 IN | HEART RATE: 91 BPM | RESPIRATION RATE: 15 BRPM | DIASTOLIC BLOOD PRESSURE: 86 MMHG | BODY MASS INDEX: 36.26 KG/M2

## 2025-03-03 DIAGNOSIS — J32.9 CHRONIC RHINOSINUSITIS: ICD-10-CM

## 2025-03-03 DIAGNOSIS — E66.812 CLASS 2 SEVERE OBESITY DUE TO EXCESS CALORIES WITH SERIOUS COMORBIDITY AND BODY MASS INDEX (BMI) OF 36.0 TO 36.9 IN ADULT: Primary | ICD-10-CM

## 2025-03-03 DIAGNOSIS — E66.01 CLASS 2 SEVERE OBESITY DUE TO EXCESS CALORIES WITH SERIOUS COMORBIDITY AND BODY MASS INDEX (BMI) OF 36.0 TO 36.9 IN ADULT: Primary | ICD-10-CM

## 2025-03-03 RX ORDER — TRIAMCINOLONE ACETONIDE 55 UG/1
1 SPRAY, METERED NASAL DAILY
Qty: 16.9 ML | Refills: 5 | Status: SHIPPED | OUTPATIENT
Start: 2025-03-03

## 2025-03-03 RX ORDER — SEMAGLUTIDE 0.25 MG/.5ML
0.25 INJECTION, SOLUTION SUBCUTANEOUS
Qty: 2 ML | Refills: 11 | Status: SHIPPED | OUTPATIENT
Start: 2025-03-03

## 2025-03-03 NOTE — PROGRESS NOTES
Subjective:       Patient ID: Saad Lopez is a 49 y.o. male.    -------------------------------------    Carpal tunnel syndrome on right    Chronic pain    Hypertension    Mixed hyperlipidemia    Sleep apnea    Tinnitus        Chief Complaint: Follow-up    Obesity f/u - had lost 15+ lbs on Adipex but loss plateau'd. Regained some recently after death of a family member. He did speak with his insurance and reports there was some type of clerical issue that lead to Wegovy being denied. He thinks it would be approved if tried again.      Review of Systems   Constitutional:  Negative for chills and fever.   HENT:  Negative for sinus pressure/congestion and sore throat.    Respiratory:  Negative for cough, shortness of breath and wheezing.    Cardiovascular:  Negative for chest pain and leg swelling.   Gastrointestinal:  Negative for abdominal pain, nausea and vomiting.   Genitourinary:  Negative for dysuria and hematuria.   Musculoskeletal:  Negative for arthralgias and myalgias.   Integumentary:  Negative for rash.   Neurological:  Negative for dizziness and syncope.   Hematological:  Negative for adenopathy.   Psychiatric/Behavioral:  Negative for confusion. The patient is not nervous/anxious.            Objective:      Physical Exam  Vitals and nursing note reviewed.   Constitutional:       General: He is not in acute distress.     Appearance: He is obese.   HENT:      Head: Normocephalic.      Nose: No rhinorrhea.   Eyes:      Conjunctiva/sclera: Conjunctivae normal.      Pupils: Pupils are equal, round, and reactive to light.   Cardiovascular:      Rate and Rhythm: Normal rate and regular rhythm.   Pulmonary:      Effort: Pulmonary effort is normal.      Breath sounds: Normal breath sounds.   Abdominal:      Palpations: Abdomen is soft.      Tenderness: There is no abdominal tenderness.   Musculoskeletal:         General: No deformity or signs of injury.   Skin:     General: Skin is warm and dry.    Neurological:      General: No focal deficit present.      Mental Status: He is alert. Mental status is at baseline.   Psychiatric:         Mood and Affect: Mood normal.         Behavior: Behavior normal.           Assessment & Plan:   1. Class 2 severe obesity due to excess calories with serious comorbidity and body mass index (BMI) of 36.0 to 36.9 in adult  Assessment & Plan:  We'll retry Rx for Wegovy (according to 's website and pt's recent call to insurance, should be covered). He's an ideal GLP1 candidate. Start Wegovy at 0.25 mg weekly titrating up as needed for efficacy.     Orders:  -     semaglutide, weight loss, (WEGOVY) 0.25 mg/0.5 mL PnIj; Inject 0.25 mg into the skin every 7 days.  Dispense: 2 mL; Refill: 11    2. Chronic rhinosinusitis  Comments:  pt requests refill on triamcinolone nasal spray (originally from ENT)  Orders:  -     triamcinolone (NASACORT) 55 mcg nasal inhaler; 1 spray by Nasal route once daily.  Dispense: 16.9 mL; Refill: 5        Follow up in about 2 months (around 5/3/2025) for Wellness. In addition to their scheduled follow up, the patient has also been instructed to follow up on as needed basis.

## 2025-03-06 NOTE — ASSESSMENT & PLAN NOTE
We'll retry Rx for Wegovy (according to 's website and pt's recent call to insurance, should be covered). He's an ideal GLP1 candidate. Start Wegovy at 0.25 mg weekly titrating up as needed for efficacy.

## 2025-03-26 DIAGNOSIS — N40.1 BENIGN PROSTATIC HYPERPLASIA WITH LOWER URINARY TRACT SYMPTOMS, SYMPTOM DETAILS UNSPECIFIED: ICD-10-CM

## 2025-03-26 RX ORDER — TAMSULOSIN HYDROCHLORIDE 0.4 MG/1
0.4 CAPSULE ORAL DAILY
Qty: 30 CAPSULE | Refills: 11 | Status: SHIPPED | OUTPATIENT
Start: 2025-03-26 | End: 2026-03-26

## 2025-05-09 ENCOUNTER — LAB VISIT (OUTPATIENT)
Dept: LAB | Facility: HOSPITAL | Age: 50
End: 2025-05-09
Attending: STUDENT IN AN ORGANIZED HEALTH CARE EDUCATION/TRAINING PROGRAM
Payer: OTHER GOVERNMENT

## 2025-05-09 DIAGNOSIS — I10 PRIMARY HYPERTENSION: ICD-10-CM

## 2025-05-09 DIAGNOSIS — Z11.4 SCREENING FOR HIV (HUMAN IMMUNODEFICIENCY VIRUS): ICD-10-CM

## 2025-05-09 DIAGNOSIS — Z12.5 SCREENING FOR MALIGNANT NEOPLASM OF PROSTATE: ICD-10-CM

## 2025-05-09 DIAGNOSIS — Z00.00 WELLNESS EXAMINATION: ICD-10-CM

## 2025-05-09 DIAGNOSIS — Z11.59 NEED FOR HEPATITIS C SCREENING TEST: ICD-10-CM

## 2025-05-09 LAB
ALBUMIN SERPL-MCNC: 3.8 G/DL (ref 3.5–5)
ALBUMIN/GLOB SERPL: 1 RATIO (ref 1.1–2)
ALP SERPL-CCNC: 82 UNIT/L (ref 40–150)
ALT SERPL-CCNC: 24 UNIT/L (ref 0–55)
ANION GAP SERPL CALC-SCNC: 8 MEQ/L
AST SERPL-CCNC: 17 UNIT/L (ref 11–45)
BASOPHILS # BLD AUTO: 0.02 X10(3)/MCL
BASOPHILS NFR BLD AUTO: 0.3 %
BILIRUB SERPL-MCNC: 0.4 MG/DL
BUN SERPL-MCNC: 11.4 MG/DL (ref 8.9–20.6)
CALCIUM SERPL-MCNC: 9.1 MG/DL (ref 8.4–10.2)
CHLORIDE SERPL-SCNC: 102 MMOL/L (ref 98–107)
CHOLEST SERPL-MCNC: 190 MG/DL
CHOLEST/HDLC SERPL: 5 {RATIO} (ref 0–5)
CO2 SERPL-SCNC: 29 MMOL/L (ref 22–29)
CREAT SERPL-MCNC: 0.86 MG/DL (ref 0.72–1.25)
CREAT/UREA NIT SERPL: 13
EOSINOPHIL # BLD AUTO: 0.15 X10(3)/MCL (ref 0–0.9)
EOSINOPHIL NFR BLD AUTO: 2.3 %
ERYTHROCYTE [DISTWIDTH] IN BLOOD BY AUTOMATED COUNT: 14.1 % (ref 11.5–17)
EST. AVERAGE GLUCOSE BLD GHB EST-MCNC: 91.1 MG/DL
GFR SERPLBLD CREATININE-BSD FMLA CKD-EPI: >60 ML/MIN/1.73/M2
GLOBULIN SER-MCNC: 3.8 GM/DL (ref 2.4–3.5)
GLUCOSE SERPL-MCNC: 81 MG/DL (ref 74–100)
HBA1C MFR BLD: 4.8 %
HCT VFR BLD AUTO: 45.3 % (ref 42–52)
HDLC SERPL-MCNC: 36 MG/DL (ref 35–60)
HGB BLD-MCNC: 14.7 G/DL (ref 14–18)
IMM GRANULOCYTES # BLD AUTO: 0.02 X10(3)/MCL (ref 0–0.04)
IMM GRANULOCYTES NFR BLD AUTO: 0.3 %
LDLC SERPL CALC-MCNC: 118 MG/DL (ref 50–140)
LYMPHOCYTES # BLD AUTO: 1.83 X10(3)/MCL (ref 0.6–4.6)
LYMPHOCYTES NFR BLD AUTO: 28 %
MCH RBC QN AUTO: 25.1 PG (ref 27–31)
MCHC RBC AUTO-ENTMCNC: 32.5 G/DL (ref 33–36)
MCV RBC AUTO: 77.3 FL (ref 80–94)
MONOCYTES # BLD AUTO: 0.82 X10(3)/MCL (ref 0.1–1.3)
MONOCYTES NFR BLD AUTO: 12.6 %
NEUTROPHILS # BLD AUTO: 3.69 X10(3)/MCL (ref 2.1–9.2)
NEUTROPHILS NFR BLD AUTO: 56.5 %
NRBC BLD AUTO-RTO: 0 %
PLATELET # BLD AUTO: 269 X10(3)/MCL (ref 130–400)
PMV BLD AUTO: 11.3 FL (ref 7.4–10.4)
POTASSIUM SERPL-SCNC: 3.8 MMOL/L (ref 3.5–5.1)
PROT SERPL-MCNC: 7.6 GM/DL (ref 6.4–8.3)
PSA SERPL-MCNC: 3.76 NG/ML
RBC # BLD AUTO: 5.86 X10(6)/MCL (ref 4.7–6.1)
SODIUM SERPL-SCNC: 139 MMOL/L (ref 136–145)
TRIGL SERPL-MCNC: 182 MG/DL (ref 34–140)
TSH SERPL-ACNC: 2.86 UIU/ML (ref 0.35–4.94)
VLDLC SERPL CALC-MCNC: 36 MG/DL
WBC # BLD AUTO: 6.53 X10(3)/MCL (ref 4.5–11.5)

## 2025-05-09 PROCEDURE — 83036 HEMOGLOBIN GLYCOSYLATED A1C: CPT

## 2025-05-09 PROCEDURE — 86803 HEPATITIS C AB TEST: CPT

## 2025-05-09 PROCEDURE — 84443 ASSAY THYROID STIM HORMONE: CPT

## 2025-05-09 PROCEDURE — 36415 COLL VENOUS BLD VENIPUNCTURE: CPT

## 2025-05-09 PROCEDURE — 80053 COMPREHEN METABOLIC PANEL: CPT

## 2025-05-09 PROCEDURE — 87389 HIV-1 AG W/HIV-1&-2 AB AG IA: CPT

## 2025-05-09 PROCEDURE — 84153 ASSAY OF PSA TOTAL: CPT

## 2025-05-09 PROCEDURE — 80061 LIPID PANEL: CPT

## 2025-05-09 PROCEDURE — 85025 COMPLETE CBC W/AUTO DIFF WBC: CPT

## 2025-05-10 LAB
HCV AB SERPL QL IA: NONREACTIVE
HIV 1+2 AB+HIV1 P24 AG SERPL QL IA: NONREACTIVE

## 2025-05-12 ENCOUNTER — RESULTS FOLLOW-UP (OUTPATIENT)
Dept: PRIMARY CARE CLINIC | Facility: CLINIC | Age: 50
End: 2025-05-12

## 2025-05-19 ENCOUNTER — OFFICE VISIT (OUTPATIENT)
Dept: PRIMARY CARE CLINIC | Facility: CLINIC | Age: 50
End: 2025-05-19
Payer: OTHER GOVERNMENT

## 2025-05-19 VITALS
DIASTOLIC BLOOD PRESSURE: 97 MMHG | SYSTOLIC BLOOD PRESSURE: 143 MMHG | OXYGEN SATURATION: 98 % | WEIGHT: 228 LBS | HEART RATE: 85 BPM | HEIGHT: 67 IN | BODY MASS INDEX: 35.79 KG/M2 | RESPIRATION RATE: 15 BRPM

## 2025-05-19 DIAGNOSIS — Z00.00 WELLNESS EXAMINATION: Primary | ICD-10-CM

## 2025-05-19 DIAGNOSIS — Z80.42 FAMILY HISTORY OF PROSTATE CANCER: ICD-10-CM

## 2025-05-19 DIAGNOSIS — Z12.5 SCREENING FOR MALIGNANT NEOPLASM OF PROSTATE: ICD-10-CM

## 2025-05-19 DIAGNOSIS — E66.812 CLASS 2 SEVERE OBESITY DUE TO EXCESS CALORIES WITH SERIOUS COMORBIDITY AND BODY MASS INDEX (BMI) OF 35.0 TO 35.9 IN ADULT: ICD-10-CM

## 2025-05-19 DIAGNOSIS — E66.01 CLASS 2 SEVERE OBESITY DUE TO EXCESS CALORIES WITH SERIOUS COMORBIDITY AND BODY MASS INDEX (BMI) OF 35.0 TO 35.9 IN ADULT: ICD-10-CM

## 2025-05-19 PROCEDURE — 99396 PREV VISIT EST AGE 40-64: CPT | Mod: ,,, | Performed by: STUDENT IN AN ORGANIZED HEALTH CARE EDUCATION/TRAINING PROGRAM

## 2025-05-19 RX ORDER — SEMAGLUTIDE 0.5 MG/.5ML
0.5 INJECTION, SOLUTION SUBCUTANEOUS
Qty: 2 ML | Refills: 11 | Status: SHIPPED | OUTPATIENT
Start: 2025-05-19

## 2025-05-19 NOTE — PROGRESS NOTES
ANNUAL WELLNESS NOTE    Chief Complaint:  No chief complaint on file.     HPI:  Saad Lopez is a 50 y.o. male    -------------------------------------    Carpal tunnel syndrome on right    Chronic pain    Hypertension    Mixed hyperlipidemia    Sleep apnea    Tinnitus       Patient Care Team:  Scot Castorena MD as PCP - General (Internal Medicine)  Ben Dawson MD as Consulting Physician (Cardiology)    Presents today for wellness visit. Describes overall health as good. Diet is balanced. Exercises rarely but walks about 5k steps daily. Tobacco use: previous but has quit April 2021, smoked about <1/2 ppd x 20 years. Alcohol use: rare (special occasion). Caffeine intake: a few soda/day, coffee on weekends. Eye exam: annually (wears glasses). Dental exam: twice annually.    Review of Systems   Constitutional:  Negative for chills and fever.   HENT:  Negative for sinus pressure/congestion and sore throat.    Respiratory:  Negative for cough, shortness of breath and wheezing.    Cardiovascular:  Negative for chest pain and leg swelling.   Gastrointestinal:  Negative for abdominal pain, nausea and vomiting.   Genitourinary:  Negative for dysuria and hematuria.   Musculoskeletal:  Negative for arthralgias and myalgias.   Integumentary:  Negative for rash.   Neurological:  Negative for dizziness and syncope.   Hematological:  Negative for adenopathy.   Psychiatric/Behavioral:  Negative for confusion. The patient is not nervous/anxious.        Physical Exam  Vitals and nursing note reviewed.   Constitutional:       General: He is not in acute distress.     Appearance: He is obese.   HENT:      Head: Normocephalic.      Nose: No rhinorrhea.   Eyes:      Conjunctiva/sclera: Conjunctivae normal.      Pupils: Pupils are equal, round, and reactive to light.   Cardiovascular:      Rate and Rhythm: Normal rate and regular rhythm.   Pulmonary:      Effort: Pulmonary effort is normal.      Breath sounds:  Normal breath sounds.   Abdominal:      Palpations: Abdomen is soft.      Tenderness: There is no abdominal tenderness.   Musculoskeletal:         General: No deformity or signs of injury.   Skin:     General: Skin is warm and dry.   Neurological:      General: No focal deficit present.      Mental Status: He is alert. Mental status is at baseline.   Psychiatric:         Mood and Affect: Mood normal.         Behavior: Behavior normal.       Assessment/Plan:  1. Wellness examination  Assessment & Plan:  Reviewed recent wellness labs. See below for health maintenance.    Vaccinations:   - Flu: due this fall, typically does receive   - Pneumonia:    - Shingles (>50):    - Tdap: due 2027   - COVID: received x 2  Screening:   - Prostate (>45): upper end of normal, especially at 49 yo. Father had advanced prostate ca. Repeat PSA 6 mo   - Lung Ca. Screening (50-80 with >20 pack yrs current or quit <15 yrs):    - Colon Ca. Screening (>45): reports colonoscopy 2-3 years ago. [facility]   - AAA (65-75 if ever smoked):    - Cardiac:    - Hep. C, HIV: screening negative 2024      2. Class 2 severe obesity due to excess calories with serious comorbidity and body mass index (BMI) of 35.0 to 35.9 in adult  Assessment & Plan:  Tolerating Wegovy well. Lost a few lbs on 0.25 mg. Increase to 0.5 mg weekly    Orders:  -     semaglutide, weight loss, (WEGOVY) 0.5 mg/0.5 mL PnIj; Inject 0.5 mg into the skin every 7 days.  Dispense: 2 mL; Refill: 11    3. Family history of prostate cancer  -     PSA, Screening; Future; Expected date: 11/19/2025    4. Screening for malignant neoplasm of prostate  -     PSA, Screening; Future; Expected date: 11/19/2025        Follow up in about 3 months (around 8/19/2025) for Weight Loss.

## 2025-05-19 NOTE — ASSESSMENT & PLAN NOTE
Reviewed recent wellness labs. See below for health maintenance.    Vaccinations:   - Flu: due this fall, typically does receive   - Pneumonia:    - Shingles (>50):    - Tdap: due 2027   - COVID: received x 2  Screening:   - Prostate (>45): upper end of normal, especially at 49 yo. Father had advanced prostate ca. Repeat PSA 6 mo   - Lung Ca. Screening (50-80 with >20 pack yrs current or quit <15 yrs):    - Colon Ca. Screening (>45): reports colonoscopy 2-3 years ago. [facility]   - AAA (65-75 if ever smoked):    - Cardiac:    - Hep. C, HIV: screening negative 2024

## 2025-05-20 ENCOUNTER — PATIENT OUTREACH (OUTPATIENT)
Facility: CLINIC | Age: 50
End: 2025-05-20
Payer: OTHER GOVERNMENT

## 2025-05-20 NOTE — PROGRESS NOTES
Health Maintenance Topic(s) Outreach Outcomes & Actions Taken:    Colorectal Cancer Screening - Outreach Outcomes & Actions Taken  : External Records Requested & Care Team Updated if Applicable       Additional Notes:  Request Colonoscopy Report: [facility]

## 2025-05-20 NOTE — LETTER
AUTHORIZATION FOR RELEASE OF CONFIDENTIAL INFORMATION      2025      Dear [facility],    We are seeing Saad Lopez, date of birth 1975, in the clinic at [facility].  Scot Castorena MD is the patient's PCP. Saad Lopez has an outstanding lab/procedure at the time we reviewed his chart.  In order to help keep his health information updated, Saad has authorized us to request the following medical record(s):        Colonoscopy           Please fax any records to Scot Castorena MD's at  [phone]    If you have any questions, please contact  Jp EPSTEIN,CCC @ [phone]             Patient Name: Saad Lopez  :1975  Patient Phone #:[phone]                 Saad Lopez  MRN: [id number]  : 1975  Age: 48 y.o.  Sex: male         Patient/Legal Guardian Signature  This signature was collected at 2023    Saad Lopez     Self  _______________________________   Printed Name/Relationship to Patient      Consent for Examination and Treatment: I hereby authorize the providers and employees of [facility] ([facility]) to provide medical treatment/services which includes, but is not limited to, performing and administering tests and diagnostic procedures that are deemed necessary, including, but not limited to, imaging examinations, blood tests and other laboratory procedures as may be required by the hospital, clinic, or may be ordered by my physician(s) or persons working under the general and/or special instructions of my physician(s).      I understand and agree that this consent covers all authorized persons, including but not limited to physicians, residents, nurse practitioners, physicians' assistants, specialists, consultants, student nurses, and independently contracted physicians, who are called upon by the physician in charge, to carry out the diagnostic procedures and medical or  surgical treatment.     I hereby authorize [facility] to retain or dispose of any specimens or tissue, should there be such remaining from any test or procedure.     I hereby authorize and give consent for [facility] providers and employees to take photographs, images or videotapes of such diagnostic, surgical or treatment procedures of Patient as may be required by [facility] or as may be ordered by a physician. I further acknowledge and agree that [facility] may use cameras or other devices for patient monitoring.     I am aware that the practice of medicine is not an exact science, and I acknowledge that no guarantees have been made to me as to the outcome of any tests, procedures or treatment.     Authorization for Release of Information: I understand that my insurance company and/or their agents may need information necessary to make determinations about payment/reimbursement. I hereby provide authorization to release to all insurance companies, their successors, assignees, other parties with whom they may have contracted, or others acting on their behalf, that are involved with payment for any hospital and/or clinic charges incurred by the patient, any information that they request and deem necessary for payment/reimbursement, and/or quality review.  I further authorize the release of my health information to physicians or other health care practitioners on staff who are involved in my health care now and in the future, and to other health care providers, entities, or institutions for the purpose of my continued care and treatment, including referrals.     REGISTRATION AUTHORIZATION  Form No. 64526 (Rev. 7/13/2022)       Medicare Patient's Certification and Authorization to Release Information and Payment Request:  I certify that the information given by me in applying for payment under Title XVIII of the Social Security Act is correct. I authorize any vieyra of medical or other information about me to release to the  [facility], or its intermediaries or carriers, any information needed for this or a related Medicare claim. I request that payment of authorized benefits be made on my behalf.     Assignment of Insurance Benefits:   I hereby authorize any and all insurance companies, health plans, defined   benefit plans, health insurers or any entity that is or may be responsible for payment of my medical expenses to pay all hospital and medical benefits now due, and to become due and payable to me under any hospital benefits, sick benefits, injury benefits or any other benefit for services rendered to me, including Major Medical Benefits, direct to [facility] and all independently contracted physicians. I assign any and all rights that I may have against any and all insurance companies, health plans, defined benefit plans, health insurers or any entity that is or may be responsible for payment of my medical expenses, including, but not limited to any right to appeal a denial of a claim, any right to bring any action, lawsuit, administrative proceeding, or other cause of action on my behalf. I specifically assign my right to pursue litigation against any and all insurance companies, health plans, defined benefit plans, health insurers or any entity that is or may be responsible for payment of my medical expenses based upon a refusal to pay charges.            E. Valuables: It is understood and agreed that [facility] is not liable for the damage to or loss of any money, jewelry,   documents, dentures, eye glasses, hearing aids, prosthetics, or other property of value.     F. Computer Equipment: I understand and agree that should I choose to use computer equipment owned by [facility] or if I choose to access the Internet via [facility]s network, I do so at my own risk. [facility] is not responsible for any damage to my computer equipment or to any damages of any type that might arise from my loss of equipment or data.     G.  Acceptance of Financial Responsibility:  I agree that in consideration of the services and   supplies that have been   or will be furnished to the patient, I am hereby obligated to pay all charges made for or on the account of the patient according to the standard rates (in effect at the time the services and supplies are delivered) established by [facility], including its Patient Financial Assistance Policy to the extent it is applicable. I understand that I am responsible for all charges, or portions thereof, not covered by insurance or other sources. Patient refunds will be distributed only after balances at all [facility] facilities are paid.     H. Communication Authorization:  I hereby authorize [facility] and its representatives, along with any billing service   or  who may work on their behalf, to contact me on   my cell phone and/or home phone using pre- recorded messages, artificial voice messages, automatic telephone dialing devices or other computer assisted technology, or by electronic      mail, text messaging, or by any other form of electronic communication. This includes, but is not limited to, appointment reminders, yearly physical exam reminders, preventive care reminders, patient campaigns, welcome calls, and calls about account balances on my account or any account on which I am listed as a guarantor. I understand I have the right to opt out of these communications at any time.      Relationship  Between  Facility and  Provider:      I understand that some, but not all, providers furnishing services to the patient are not employees or agents of [facility]. The patient is under the care and supervision of his/her attending physician, and it is the responsibility of the facility and its nursing staff to carry out the instructions of such physicians. It is the responsibility of the patient's physician/designee to obtain the patient's informed consent, when required, for medical or surgical  treatment, special diagnostic or therapeutic procedures, or hospital services rendered for the patient under the special instructions of the physician/designee.     REGISTRATION AUTHORIZATION  Form No. 15073 (Rev. 7/13/2022)      Notice of Privacy Practices: I acknowledge I have received a copy of [facility]'s Notice of Privacy Practices.     Facility  Directory: I have discussed with the organization my desire to be either included or excluded  in the facility directory in the event of my being an inpatient at an [facility] facility. I understand that if my choice is to opt-out of being identified in the facility directory that the facility will not provide any information about me such as my condition (e.g. fair, stable, etc.) or my location in the facility (e.g., room number, department).     Immunizations: [facility] shares immunization information with state sponsored health departments to help you and your doctor keep track of your immunization records. By signing, you consent to have this information shared with the health department in your state:                                [place] - LINKS ([place] Immunization Network for Kids Statewide)                                [place] - MIIX ([place] Immunization Information eXchange)                                [place] - ImmPRINT (Immunization Patient Registry with Integrated Technology)     TERM: This authorization is valid for this and subsequent care/treatment I receive at [facility] and will remain valid unless/until revoked in writing by me.     [facility]: As used in this document, [facility] means all [facility] owned and managed facilities, including, but not limited to, all health centers, surgery centers, clinics, urgent care centers, and hospitals.         [facility] complies with applicable Federal civil rights laws and does not discriminate on the basis of race, color, national origin, age, disability, or sex.  ATENCIÓN:  si habla español, tiene a kendall disposición servicios gratuitos de asistencia lingüística. Llame al [phone].  FLORINA Ý: N?u b?n nói Ti?ng Vi?t, có các d?ch v? h? tr? ngôn ng? mi?n phí dành cho b?n. G?i s? [phone].

## 2025-05-29 DIAGNOSIS — M19.90 OSTEOARTHRITIS, UNSPECIFIED OSTEOARTHRITIS TYPE, UNSPECIFIED SITE: ICD-10-CM

## 2025-05-29 RX ORDER — CELECOXIB 200 MG/1
200 CAPSULE ORAL DAILY PRN
Qty: 30 CAPSULE | Refills: 2 | Status: SHIPPED | OUTPATIENT
Start: 2025-05-29

## 2025-08-04 ENCOUNTER — TELEPHONE (OUTPATIENT)
Dept: PRIMARY CARE CLINIC | Facility: CLINIC | Age: 50
End: 2025-08-04
Payer: OTHER GOVERNMENT

## 2025-08-04 DIAGNOSIS — I10 PRIMARY HYPERTENSION: ICD-10-CM

## 2025-08-04 RX ORDER — AMLODIPINE BESYLATE 5 MG/1
5 TABLET ORAL DAILY
Qty: 30 TABLET | Refills: 11 | Status: SHIPPED | OUTPATIENT
Start: 2025-08-04 | End: 2026-07-30

## 2025-08-15 ENCOUNTER — DOCUMENTATION ONLY (OUTPATIENT)
Dept: PRIMARY CARE CLINIC | Facility: CLINIC | Age: 50
End: 2025-08-15
Payer: OTHER GOVERNMENT

## 2025-08-21 ENCOUNTER — OFFICE VISIT (OUTPATIENT)
Dept: PRIMARY CARE CLINIC | Facility: CLINIC | Age: 50
End: 2025-08-21
Payer: OTHER GOVERNMENT

## 2025-08-21 VITALS
WEIGHT: 221 LBS | SYSTOLIC BLOOD PRESSURE: 129 MMHG | HEART RATE: 77 BPM | RESPIRATION RATE: 15 BRPM | BODY MASS INDEX: 34.61 KG/M2 | OXYGEN SATURATION: 98 % | DIASTOLIC BLOOD PRESSURE: 86 MMHG

## 2025-08-21 DIAGNOSIS — E66.811 CLASS 1 OBESITY DUE TO EXCESS CALORIES WITH SERIOUS COMORBIDITY AND BODY MASS INDEX (BMI) OF 34.0 TO 34.9 IN ADULT: Primary | ICD-10-CM

## 2025-08-21 DIAGNOSIS — E66.09 CLASS 1 OBESITY DUE TO EXCESS CALORIES WITH SERIOUS COMORBIDITY AND BODY MASS INDEX (BMI) OF 34.0 TO 34.9 IN ADULT: Primary | ICD-10-CM

## 2025-08-22 PROBLEM — E66.09 CLASS 1 OBESITY DUE TO EXCESS CALORIES WITH SERIOUS COMORBIDITY AND BODY MASS INDEX (BMI) OF 34.0 TO 34.9 IN ADULT: Status: ACTIVE | Noted: 2024-09-03

## 2025-08-22 PROBLEM — E66.811 CLASS 1 OBESITY DUE TO EXCESS CALORIES WITH SERIOUS COMORBIDITY AND BODY MASS INDEX (BMI) OF 34.0 TO 34.9 IN ADULT: Status: ACTIVE | Noted: 2024-09-03

## (undated) DEVICE — BLADE SCALP OPHTL BEVEL STR

## (undated) DEVICE — GLOVE PROTEXIS PI SYN SURG 7.5

## (undated) DEVICE — PAD PREP 50/CA

## (undated) DEVICE — PADDING CAST 4IN DELTA ROLL

## (undated) DEVICE — BANDAGE ELAS COMPR 4IN 5.8YD

## (undated) DEVICE — TOURNIQUET SB QC DP 18X4IN

## (undated) DEVICE — CAST PLSTR SPLINT X-FAST 3X15

## (undated) DEVICE — SOL NACL IRR 1000ML BTL

## (undated) DEVICE — SUT 5/0 18IN PROLENE BL MO

## (undated) DEVICE — STOCKINET 4INX48

## (undated) DEVICE — DRESSING N ADH OIL EMUL 3X3

## (undated) DEVICE — NDL HYPO 27G X 1 1/2

## (undated) DEVICE — SPONGE LAP STRL 18X18IN

## (undated) DEVICE — ELECTRODE PATIENT RETURN DISP

## (undated) DEVICE — SEE MEDLINE ITEM 146410

## (undated) DEVICE — SUT 6/0 18IN PROLENE BL MO

## (undated) DEVICE — SEE MEDLINE ITEM 157173

## (undated) DEVICE — NDL SYR 10ML 18X1.5 LL BLUNT

## (undated) DEVICE — SUPPORT ULNA NERVE PROTECTOR

## (undated) DEVICE — Device